# Patient Record
Sex: FEMALE | Race: WHITE | NOT HISPANIC OR LATINO | Employment: FULL TIME | URBAN - METROPOLITAN AREA
[De-identification: names, ages, dates, MRNs, and addresses within clinical notes are randomized per-mention and may not be internally consistent; named-entity substitution may affect disease eponyms.]

---

## 2023-04-19 LAB
ALANINE AMINOTRANSFERASE (SGPT) (U/L) IN SER/PLAS: 14 U/L (ref 7–45)
ALBUMIN (G/DL) IN SER/PLAS: 4.3 G/DL (ref 3.4–5)
ALKALINE PHOSPHATASE (U/L) IN SER/PLAS: 73 U/L (ref 33–110)
ANION GAP IN SER/PLAS: 10 MMOL/L (ref 10–20)
ASPARTATE AMINOTRANSFERASE (SGOT) (U/L) IN SER/PLAS: 22 U/L (ref 9–39)
BILIRUBIN TOTAL (MG/DL) IN SER/PLAS: 0.6 MG/DL (ref 0–1.2)
CALCIUM (MG/DL) IN SER/PLAS: 9.3 MG/DL (ref 8.6–10.6)
CARBON DIOXIDE, TOTAL (MMOL/L) IN SER/PLAS: 30 MMOL/L (ref 21–32)
CHLORIDE (MMOL/L) IN SER/PLAS: 102 MMOL/L (ref 98–107)
CREATININE (MG/DL) IN SER/PLAS: 0.89 MG/DL (ref 0.5–1.05)
ERYTHROCYTE DISTRIBUTION WIDTH (RATIO) BY AUTOMATED COUNT: 12.2 % (ref 11.5–14.5)
ERYTHROCYTE MEAN CORPUSCULAR HEMOGLOBIN CONCENTRATION (G/DL) BY AUTOMATED: 32.2 G/DL (ref 32–36)
ERYTHROCYTE MEAN CORPUSCULAR VOLUME (FL) BY AUTOMATED COUNT: 94 FL (ref 80–100)
ERYTHROCYTES (10*6/UL) IN BLOOD BY AUTOMATED COUNT: 4.48 X10E12/L (ref 4–5.2)
GFR FEMALE: 87 ML/MIN/1.73M2
GLUCOSE (MG/DL) IN SER/PLAS: 62 MG/DL (ref 74–99)
HEMATOCRIT (%) IN BLOOD BY AUTOMATED COUNT: 41.9 % (ref 36–46)
HEMOGLOBIN (G/DL) IN BLOOD: 13.5 G/DL (ref 12–16)
LEUKOCYTES (10*3/UL) IN BLOOD BY AUTOMATED COUNT: 6.3 X10E9/L (ref 4.4–11.3)
NRBC (PER 100 WBCS) BY AUTOMATED COUNT: 0 /100 WBC (ref 0–0)
PLATELETS (10*3/UL) IN BLOOD AUTOMATED COUNT: 270 X10E9/L (ref 150–450)
POTASSIUM (MMOL/L) IN SER/PLAS: 4 MMOL/L (ref 3.5–5.3)
PROTEIN TOTAL: 6.9 G/DL (ref 6.4–8.2)
SODIUM (MMOL/L) IN SER/PLAS: 138 MMOL/L (ref 136–145)
THYROTROPIN (MIU/L) IN SER/PLAS BY DETECTION LIMIT <= 0.05 MIU/L: 1.54 MIU/L (ref 0.44–3.98)
UREA NITROGEN (MG/DL) IN SER/PLAS: 13 MG/DL (ref 6–23)

## 2024-07-09 ENCOUNTER — HOSPITAL ENCOUNTER (OUTPATIENT)
Dept: RADIOLOGY | Facility: HOSPITAL | Age: 34
Discharge: HOME | End: 2024-07-09
Payer: COMMERCIAL

## 2024-07-09 ENCOUNTER — OFFICE VISIT (OUTPATIENT)
Dept: ORTHOPEDIC SURGERY | Facility: HOSPITAL | Age: 34
End: 2024-07-09
Payer: COMMERCIAL

## 2024-07-09 DIAGNOSIS — M54.50 LOW BACK PAIN, UNSPECIFIED BACK PAIN LATERALITY, UNSPECIFIED CHRONICITY, UNSPECIFIED WHETHER SCIATICA PRESENT: ICD-10-CM

## 2024-07-09 DIAGNOSIS — M54.50 LOW BACK PAIN, UNSPECIFIED BACK PAIN LATERALITY, UNSPECIFIED CHRONICITY, UNSPECIFIED WHETHER SCIATICA PRESENT: Primary | ICD-10-CM

## 2024-07-09 PROCEDURE — 72100 X-RAY EXAM L-S SPINE 2/3 VWS: CPT

## 2024-07-09 PROCEDURE — 1036F TOBACCO NON-USER: CPT | Performed by: ORTHOPAEDIC SURGERY

## 2024-07-09 PROCEDURE — 99213 OFFICE O/P EST LOW 20 MIN: CPT | Performed by: ORTHOPAEDIC SURGERY

## 2024-07-09 PROCEDURE — 72148 MRI LUMBAR SPINE W/O DYE: CPT | Performed by: RADIOLOGY

## 2024-07-09 PROCEDURE — 72148 MRI LUMBAR SPINE W/O DYE: CPT

## 2024-07-09 PROCEDURE — 72100 X-RAY EXAM L-S SPINE 2/3 VWS: CPT | Performed by: RADIOLOGY

## 2024-07-09 NOTE — PROGRESS NOTES
PRIMARY CARE PHYSICIAN: Alvino Dejeuss MD  REFERRING PROVIDER: No referring provider defined for this encounter.     CONSULT ORTHOPAEDIC: Lumbar Spine Evaluation      SUBJECTIVE  CHIEF COMPLAINT: Low back pain       HPI: Susana Rivera is a 33 y.o. active female presenting for a new patient evaluation of low back pain.  She notes several years ago when lifting an object feeling sharp pain in her low back.  Since that time she has had difficulty with lifting and has had intermittent low back pain and some mild radicular symptoms.  These have recently been exacerbated and limited her ability to exercise.  No bowel or bladder change.  No lower extremity weakness.  She has attempted activity modifications and home exercises that have failed to provide lasting relief.  She desires to remain active with exercise.  She has utilized oral anti-inflammatory medication for several months.    REVIEW OF SYSTEMS  Constitutional: See HPI for pain assessment, No significant weight loss, recent trauma  Cardiovascular: No chest pain, shortness of breath  Respiratory: No difficulty breathing, cough  Gastrointestinal: No nausea, vomiting, diarrhea, constipation  Musculoskeletal: Noted in HPI, positive for pain, restricted motion, stiffness  Integumentary: No rashes, easy bruising, redness   Neurological: no numbness or tingling in extremities, no gait disturbances   Psychiatric: No mood changes, memory changes, social issues  Heme/Lymph: no excessive swelling, easy bruising, excessive bleeding  ENT: No hearing changes  Eyes: No vision changes    History reviewed. No pertinent past medical history.     No Known Allergies     Past Surgical History:   Procedure Laterality Date    RHINOPLASTY  10/28/2014    Rhinoplasty        No family history on file.     Social History     Socioeconomic History    Marital status: Single     Spouse name: Not on file    Number of children: Not on file    Years of education: Not on file    Highest education  "level: Not on file   Occupational History    Not on file   Tobacco Use    Smoking status: Not on file    Smokeless tobacco: Not on file   Substance and Sexual Activity    Alcohol use: Not on file    Drug use: Not on file    Sexual activity: Not on file   Other Topics Concern    Not on file   Social History Narrative    Not on file     Social Determinants of Health     Financial Resource Strain: Not on file   Food Insecurity: Not on file   Transportation Needs: Not on file   Physical Activity: Not on file   Stress: Not on file   Social Connections: Not on file   Intimate Partner Violence: Not on file   Housing Stability: Not on file        CURRENT MEDICATIONS:   No current outpatient medications on file.     No current facility-administered medications for this visit.        OBJECTIVE    PHYSICAL EXAM      1/15/2020    11:32 AM 2/18/2020    11:05 AM 6/2/2020    10:10 AM 2/1/2022    11:41 AM 3/7/2022     9:46 AM 12/19/2022     3:21 PM 4/18/2023     4:08 PM   Vitals   Systolic 133 112 118 124 120 128 132   Diastolic 75 68 72 78 76 73 70   Heart Rate 71 85 59 77  74    Temp 37.2 °C (99 °F) 37.2 °C (99 °F) 36.7 °C (98 °F)       Resp 16     16    Height (in) 1.727 m (5' 8\") 1.727 m (5' 8\") 1.727 m (5' 8\")  1.727 m (5' 8\")  1.753 m (5' 9\")   Weight (lb) 165 162 180  180.56 185.38 190   BMI 25.09 kg/m2 24.63 kg/m2 27.37 kg/m2  27.45 kg/m2 28.19 kg/m2 28.06 kg/m2   BSA (m2) 1.89 m2 1.88 m2 1.98 m2  1.98 m2 2.01 m2 2.05 m2      There is no height or weight on file to calculate BMI.    MUSCULOSKELETAL:  33 y.o. year-old female in no acute distress. Alert and oriented x 3. Well Nourished. Normal affect. Good historian.  Positive ligamentous hyperlaxity.  Gait: Normal Tandem. No abnormalities of balance or coordination.  Lumbar spine: Full range of motion.  There is a little discomfort with left straight leg raise compared to right.  Positive paraspinal spasm and tenderness.  Skin: Intact bilateral upper and lower extremities. " No erythema, ecchymosis, or temperature changes.   Bilateral hips with full range of motion and negative impingement maneuver.  She is tight in her hip flexors and hamstring.  Motor Strength: 5 out of 5 in the bilateral deltoid, biceps, triceps, wrist flexors, wrist extensors.  Neuro: C5-T1 sensation intact grossly bilaterally. Symmetric bicep/tricep reflexes bilaterally.  Vascular: 2+ radial and ulnar pulses bilaterally.     Imaging:   3 views of the lumbar spine were performed today with some slight rotational malalignment on the AP at L4 to the sacrum.  There is slight loss of disc height at L4-5 and L5-S1.  Mild sclerosis of the posterior elements.    ASSESSMENT & PLAN    Impression: 33 y.o. female with persistent low back pain and paraspinal spasm.    Plan:   I explained to the patient the nature of their diagnosis.  I reviewed their imaging studies with them.    She has worsening symptoms that have been present for several years despite activity modifications.  Radiographs reveal some loss of alignment and disc height narrowing.  Therefore an MRI is ordered to evaluate the status of her intervertebral discs.  Physical therapy is also ordered to maximize her flexibility as well as her core and gluteus medius function.  A formal low back program will be initiated.    Follow-Up: Patient will follow-up after the MRI.  We will discuss additional intervention if formal rehabilitation fails.    At the end of the visit, all questions were answered in full. The patient is in agreement with the plan and recommendations. They will call the office with any questions/concerns.    Note dictated with [employer] software. Completed without full typed error editing and sent to avoid delay.

## 2024-07-10 PROBLEM — M54.50 LUMBAR SPINE PAIN: Status: ACTIVE | Noted: 2024-07-10

## 2024-07-10 NOTE — PROGRESS NOTES
Physical Therapy  Physical Therapy Orthopedic Evaluation    Patient Name: Susana Rivera  MRN: [id number]  Today's Date: 7/11/2024  Time Calculation  Start Time: 1250  Stop Time: 1335  Time Calculation (min): 45 min    Insurance:  Visit number: 1 of 20  Authorization info: NAN; hard limit  Insurance Type: [employer] NO vaso, 4 unit limit. High deductible and OOPmax     General:  Reason for visit: Low back pain, acute on chronic and mild radicular symptoms  Referred by: Dr. Orr     Current Problem:  1. Lumbar spine pain        2. Low back pain, unspecified back pain laterality, unspecified chronicity, unspecified whether sciatica present  Referral to Physical Therapy          Precautions: L3-L4 and L4-L5 disc degeneration, + Slump R  STEADI = 0          Medical History Form: Reviewed (scanned into chart)    Subjective:     Chief Complaint: 34 year old female presents with acute on chronic low back pain with mild radicular symptoms. 12 years ago patient was lifting and felt a sharp pain in her low back. Patient has had intermittent LBP a few times a year since then- with flare ups they can last a few days to a few weeks. Pain with sleeping on soft bed, lifting heavy object. Patient reports she can do everything perfectly fine when not in a flare up. Denies radicular symptoms into the LE.     Onset Date: chronic   ARMEN: Insidious and Chronic    Current Condition:   Worse    Pain:  Pain Assessment: 0-10  0-10 (Numeric) Pain Score: 3  Location: R side low back , radiates into hips   Description: sore, pulling, pain   Current pain: 3/10   Worst pain: 6/10 -pt reports she has high pain tolerance  Meds: NSAID PRN   Aggravating Factors: Lifting/carrying  Relieving Factors:  Lying Supine and Heat    Relevant Information (PMH & Previous Tests/Imaging): MRI: L4-5 disc protrusion contributes to narrowing of the lateral   recesses and mild central canal stenosis.   2. Degenerative changes primarily involving L4-5 and L3-4.   3.  Transitional type lumbosacral vertebral body labeled as a   partially sacralized L5 segment. There are rudimentary ribs at what   is labeled T12.     Previous Interventions/Treatments: Chiropractic- made it worse every time. Massage helps     Prior Level of Function (PLOF)  Patient previously independent with all ADLs  Exercise/Physical Activity: yoga, heavy lifting- not in the past year   Work/School: Desk job     Patients Living Environment: Reviewed and no concern    Primary Language: English    There are no spiritual/cultural practices/values/needs that are important to know    Patient's Goal(s) for Therapy: get rid of pain, get stronger     Red Flags: Do you have any of the following? No  Fever/chills, unexplained weight changes, dizziness/fainting, unexplained change in bowel or bladder functions, unexplained malaise or muscle weakness, night pain/sweats, numbness or tingling    Objective:  Objective       ROM    Lumbar AROM (%)    Flexion: full range, ERP    Extension: WNL  (L) Side Bend: WNL  (R) Side Bend: WNL  (L) Rotation: WNL  (R) Rotation: WNL      Hip AROM (Degrees)      (R)  (L)  Flexion: WNL  WNL   Extension: WNL  WNL    Abduction: WNL, mild pain in R SI  WNL      ER:  Mild limitation  WNL     IR:  WNL  WNL             Strength Testing    Core/Abdominals: 4/5, fair TA activation     Hip    (R)  (L)  Flexion: 4+  4+     Extension: 4+  4+    Abduction: 4 P!  5     ER:  4  5    IR:  5 P!  5        Posture: mild rounded       Palpation: + hypertonicity R QL, thoracolumbar paraspinals,       Flexibility: Min limitation with       Joint Mobility: + pain localized R facets with spring testing L 2-L5 (most sensitive at L4/L5) + fortins sign R SI       Gait: femoral retroversion         Functional Screening    Squat: forward trunk lean   SL Quarter Squat: Min valgus bilaterally         Special Tests    Leg Length Discrepancy: none  SI Alignment: R posterior rotation  Hip Scouring: + tension R SI   SI  Compression Test: + pain R SI   SI Distraction Test: neg  90-90 SLR Test: flexibility WNL, + neural tension R   Misael Test: + limitation B quad  SARINA Test: negative  Slump Test: + R with knee extension   Relfexes 2+ L4 and S1     Radicular Symptoms: + neural tension R slump  Negative clonus       Outcome Measures:  Other Measures  Oswestry Disablity Index (STEPHEN): 10%       EDUCATION: Home exercise program, plan of care, activity modifications, pain management, and injury pathology       Goals: Set and discussed today  Active       PT Problem       PT Goal 1       Start:  07/11/24    Expected End:  09/05/24       Patient will verbalize pain (0-10) 0/10 at rest, and 3/10 worst  Negative gowers sign with lumbar ROM to demonstrate improve joint mechanics and stability during ADLs by 4-6 weeks  Patient will present with no neural tension with R slump test indicating centralization of symptoms by 3-4 weeks   B hip flexion, abduction, ER, extension strength 5/5 MMT for improved pelvic stability by 8 weeks   Oswestry Disability Index score <2% to demonstrate overall improved functional abilities by discharge (8 weeks)  Patient will correctly perform home exercise program to progress current functional status by 1 weeks               Plan of care was developed with input and agreement by the patient      Treatment Performed:    Therapeutic Exercise:    15 min  Access Code: [email or web address]  URL: [email or web address]  Date: 07/11/2024  Prepared by: Madhuri Glez    Exercises  - SELF MASSAGE BALL - GLUTE - WALL  - 1 x daily - 7 x weekly - 3 sets - 10 reps  - Hip Flexor Stretch at Edge of Bed  - 1 x daily - 7 x weekly - 3 sets - 30 hold  - Seated Long Arc Quad (90-45 Degree Range)  - 1 x daily - 7 x weekly - 3 sets - 10 reps  - Supine Transversus Abdominis Bracing - Hands on Stomach  - 1 x daily - 7 x weekly - 2 sets - 10 reps - 5 hold  - Bent Knee Fallouts  - 1 x daily - 7 x weekly - 3 sets - 10  reps    Manual Therapy:    5 min  STM to R QL, lumbar paraspinals         Assessment: 34 year old female presents with acute on chronic, intermittent low back pain. Impairments include: + R sided neural tension, hip and core weakness, + instability with transitional movements, flexibility impairments of quad and hip flexors, and functional limitations. These result in limited participation in pain-free ADLs and inability to perform at their prior level of function. Pt would benefit from physical therapy to address the impairments found & listed previously in the objective section in order to return to safe and pain-free ADLs and prior level of function.       Clinical Presentation: Stable and/or uncomplicated characteristics    Plan:     Therapy plan of care will include the following interventions: aquatic therapy, gait training, dry needling, therapeutic exercise, therapeutic activities, education/instruction, home program, electrical stimulation, manual therapy, mechanical traction, neuromuscular re-education, biofeedback, kinesiotape, cryotherapy, vasopneumatic device with cold, edema control, self care/home management, blood flow restriction (BFR)    Frequency: 1-2 x Week  Duration: 8 Weeks  Rehab Potential/Prognosis: Polo Young, PT

## 2024-07-11 ENCOUNTER — EVALUATION (OUTPATIENT)
Dept: PHYSICAL THERAPY | Facility: HOSPITAL | Age: 34
End: 2024-07-11
Payer: COMMERCIAL

## 2024-07-11 DIAGNOSIS — M54.50 LUMBAR SPINE PAIN: Primary | ICD-10-CM

## 2024-07-11 DIAGNOSIS — M54.50 LOW BACK PAIN, UNSPECIFIED BACK PAIN LATERALITY, UNSPECIFIED CHRONICITY, UNSPECIFIED WHETHER SCIATICA PRESENT: ICD-10-CM

## 2024-07-11 PROCEDURE — 97161 PT EVAL LOW COMPLEX 20 MIN: CPT | Mod: GP

## 2024-07-11 PROCEDURE — 97110 THERAPEUTIC EXERCISES: CPT | Mod: GP

## 2024-07-11 ASSESSMENT — ENCOUNTER SYMPTOMS
LOSS OF SENSATION IN FEET: 0
DEPRESSION: 0
OCCASIONAL FEELINGS OF UNSTEADINESS: 0

## 2024-07-11 ASSESSMENT — PAIN - FUNCTIONAL ASSESSMENT: PAIN_FUNCTIONAL_ASSESSMENT: 0-10

## 2024-07-11 ASSESSMENT — PAIN SCALES - GENERAL: PAINLEVEL_OUTOF10: 3

## 2024-07-15 ENCOUNTER — TREATMENT (OUTPATIENT)
Dept: PHYSICAL THERAPY | Facility: HOSPITAL | Age: 34
End: 2024-07-15
Payer: COMMERCIAL

## 2024-07-15 DIAGNOSIS — M54.50 LUMBAR SPINE PAIN: ICD-10-CM

## 2024-07-15 DIAGNOSIS — M54.50 LOW BACK PAIN, UNSPECIFIED BACK PAIN LATERALITY, UNSPECIFIED CHRONICITY, UNSPECIFIED WHETHER SCIATICA PRESENT: ICD-10-CM

## 2024-07-15 PROCEDURE — 97140 MANUAL THERAPY 1/> REGIONS: CPT | Mod: GP | Performed by: PHYSICAL THERAPIST

## 2024-07-15 PROCEDURE — 97110 THERAPEUTIC EXERCISES: CPT | Mod: GP | Performed by: PHYSICAL THERAPIST

## 2024-07-18 ENCOUNTER — TREATMENT (OUTPATIENT)
Dept: PHYSICAL THERAPY | Facility: HOSPITAL | Age: 34
End: 2024-07-18
Payer: COMMERCIAL

## 2024-07-18 DIAGNOSIS — M54.50 LOW BACK PAIN, UNSPECIFIED BACK PAIN LATERALITY, UNSPECIFIED CHRONICITY, UNSPECIFIED WHETHER SCIATICA PRESENT: ICD-10-CM

## 2024-07-18 DIAGNOSIS — M54.50 LUMBAR SPINE PAIN: ICD-10-CM

## 2024-07-18 PROCEDURE — 97110 THERAPEUTIC EXERCISES: CPT | Mod: GP | Performed by: PHYSICAL THERAPIST

## 2024-07-18 PROCEDURE — 97140 MANUAL THERAPY 1/> REGIONS: CPT | Mod: GP | Performed by: PHYSICAL THERAPIST

## 2024-07-18 ASSESSMENT — PAIN - FUNCTIONAL ASSESSMENT
PAIN_FUNCTIONAL_ASSESSMENT: 0-10
PAIN_FUNCTIONAL_ASSESSMENT: 0-10

## 2024-07-18 ASSESSMENT — PAIN SCALES - GENERAL
PAINLEVEL_OUTOF10: 3
PAINLEVEL_OUTOF10: 2

## 2024-07-18 NOTE — PROGRESS NOTES
Physical Therapy  Physical Therapy Treatment Note    Patient Name: Susana Rivera  MRN: [id number]  Today's Date: 7/15/2024  Time Calculation  Start Time: 1330  Stop Time: 1430  Time Calculation (min): 60 min    Insurance:  Visit number: 2 of 20  Authorization info: NAN; hard limit  Insurance Type: [employer] NO vaso, 4 unit limit. High deductible and OOPmax      General:  Reason for visit: Low back pain, acute on chronic and mild radicular symptoms  Referred by: Dr. Orr       Current Problem  1. Lumbar spine pain  Follow Up In Physical Therapy      2. Low back pain, unspecified back pain laterality, unspecified chronicity, unspecified whether sciatica present  Follow Up In Physical Therapy          Precautions: L3-L4 and L4-L5 disc degeneration, + Slump R  STEADI = 0       Subjective:     Patient reports general improvement in symptoms following initial evaluation; has continued to implement HEP without issues. Patient continues to experience acute low back pain with symptoms worse on (R) compared to (L)    Pain  Pain Assessment: 0-10  0-10 (Numeric) Pain Score: 3    Performing HEP?: Yes      Objective:   Core/Abdominals: 4/5, fair TA activation     Continued (+) TTP to (R) QL, thoracolumbar paraspinals, glute med, and piriformis    (R) Anterior Innominate      Treatment Performed:  Therapeutic Exercise  Therapeutic Exercise Activity 1: Pelvic Tilt 2 x 10 reps  Therapeutic Exercise Activity 2: Braeden Curl Up 2 x 5 reps w/ 3 sec hold (each side)  Therapeutic Exercise Activity 3: Bridge 2 x 10 reps  Therapeutic Exercise Activity 4: Bird Dog 2 x 10 reps  Therapeutic Exercise Activity 5: Side lying clams 2 x 10 reps w/ RTB  Therapeutic Exercise Activity 6: Pallof Press 2 x 10 reps (each side)  Therapeutic Exercise Activity 7: Squats 3 x 10 reps w/ 10lbs              Manual Therapy  Manual Therapy Activity 1: STM to (B) QL, lumbar paraspinals, glute med, and piriformis  Manual Therapy Activity 2: MET to correct  innominate              PT Therapeutic Procedures Time Entry  Manual Therapy Time Entry: 30  Therapeutic Exercise Time Entry: 30                    Assessment:   The focus of the session was Strengthening, joint mobilizations, and soft tissue massage. The pt demonstrated Good tolerance to the noted exercises today. The pt is demonstrated Good progress in skilled rehab at this time. The pt is still limited in overall Strength, Motor control, and Pain at this time. The pt continues to be a good candidate for skilled PT, in order to further improve Strength, Motor control, and Pain.        Plan:  Continue to progress Core, Hip, and LE strength focusing on restoring stability and proper movement mechanics; Manual therapy to restore pain free mobility; Patient education on HEP and activity modification.        Ankush Alegria, PT

## 2024-07-18 NOTE — PROGRESS NOTES
Physical Therapy  Physical Therapy Treatment Note    Patient Name: Susana Rivera  MRN: [id number]  Today's Date: 7/18/2024  Time Calculation  Start Time: 1330  Stop Time: 1430  Time Calculation (min): 60 min    Insurance:  Visit number: 3 of 20  Authorization info: NAN; hard limit  Insurance Type: [employer] NO vaso, 4 unit limit. High deductible and OOPmax      General:  Reason for visit: Low back pain, acute on chronic and mild radicular symptoms  Referred by: Dr. Orr       Current Problem  1. Lumbar spine pain  Follow Up In Physical Therapy      2. Low back pain, unspecified back pain laterality, unspecified chronicity, unspecified whether sciatica present  Follow Up In Physical Therapy          Precautions: L3-L4 and L4-L5 disc degeneration, + Slump R  STEADI = 0       Subjective:     Patient notes this is the best her back has felt in over a month. Reports general improvement in symptoms following last visit    Pain  Pain Assessment: 0-10  0-10 (Numeric) Pain Score: 2    Performing HEP?: Yes      Objective:   Core/Abdominals: 4/5, fair TA activation     Continued (+) TTP to (R) QL, thoracolumbar paraspinals, glute med, and piriformis    (R) Anterior Innominate      Treatment Performed:  Therapeutic Exercise  Therapeutic Exercise Activity 1: Pelvic Tilt 2 x 10 reps  Therapeutic Exercise Activity 2: Braeden Curl Up 2 x 5 reps w/ 3 sec hold (each side)  Therapeutic Exercise Activity 3: Bridge 2 x 10 reps  Therapeutic Exercise Activity 4: Bird Dog 2 x 10 reps  Therapeutic Exercise Activity 6: Pallof Press 2 x 10 reps (each side)  Therapeutic Exercise Activity 7: Squats 3 x 10 reps w/ 25lbs  Therapeutic Exercise Activity 8: Squats on jump  2 x 10 reps  Therapeutic Exercise Activity 9: Supine core brace march 2 x 10 reps  Therapeutic Exercise Activity 10: Nerve glides              Manual Therapy  Manual Therapy Activity 1: STM to (B) QL, lumbar paraspinals, glute med, and piriformis  Manual Therapy Activity  2: MET to correct innominate              PT Therapeutic Procedures Time Entry  Manual Therapy Time Entry: 25  Therapeutic Exercise Time Entry: 35                    Assessment:   The focus of the session was Strengthening, joint mobilizations, and soft tissue massage. The pt demonstrated Good tolerance to the noted exercises today. The pt is demonstrated Good progress in skilled rehab at this time. The pt is still limited in overall Strength, Motor control, and Pain at this time. The pt continues to be a good candidate for skilled PT, in order to further improve Strength, Motor control, and Pain.  Patient instructed to continue HEP while on vacation next week.      Plan:  Continue to progress Core, Hip, and LE strength focusing on restoring stability and proper movement mechanics; Manual therapy to restore pain free mobility; Patient education on HEP and activity modification.        Ankush Alegria, PT

## 2024-07-29 ENCOUNTER — APPOINTMENT (OUTPATIENT)
Dept: PHYSICAL THERAPY | Facility: HOSPITAL | Age: 34
End: 2024-07-29
Payer: COMMERCIAL

## 2024-08-06 ENCOUNTER — APPOINTMENT (OUTPATIENT)
Dept: PHYSICAL THERAPY | Facility: HOSPITAL | Age: 34
End: 2024-08-06
Payer: COMMERCIAL

## 2024-08-08 ENCOUNTER — APPOINTMENT (OUTPATIENT)
Dept: PHYSICAL THERAPY | Facility: HOSPITAL | Age: 34
End: 2024-08-08
Payer: COMMERCIAL

## 2024-11-26 ENCOUNTER — TELEPHONE (OUTPATIENT)
Facility: CLINIC | Age: 34
End: 2024-11-26
Payer: COMMERCIAL

## 2024-11-26 DIAGNOSIS — M51.360 DEGENERATION OF INTERVERTEBRAL DISC OF LUMBAR REGION WITH DISCOGENIC BACK PAIN: Primary | ICD-10-CM

## 2024-11-26 RX ORDER — PREDNISONE 10 MG/1
TABLET ORAL
Qty: 30 TABLET | Refills: 0 | Status: SHIPPED | OUTPATIENT
Start: 2024-11-26 | End: 2024-12-07

## 2024-11-26 NOTE — TELEPHONE ENCOUNTER
Lower back pain-MRI done with DDD and bulging disc. Recommend Prednisone 40/30/20/10 mg taper. ? PT/DC-Follow up if symptoms persist/worse.

## 2024-12-24 ENCOUNTER — TELEPHONE (OUTPATIENT)
Facility: CLINIC | Age: 34
End: 2024-12-24
Payer: COMMERCIAL

## 2024-12-24 DIAGNOSIS — R05.8 UPPER AIRWAY COUGH SYNDROME: Primary | ICD-10-CM

## 2024-12-24 RX ORDER — ALBUTEROL SULFATE 90 UG/1
2 INHALANT RESPIRATORY (INHALATION) EVERY 6 HOURS PRN
Qty: 18 G | Refills: 0 | Status: SHIPPED | OUTPATIENT
Start: 2024-12-24 | End: 2024-12-24 | Stop reason: SDUPTHER

## 2024-12-24 RX ORDER — ALBUTEROL SULFATE 90 UG/1
2 INHALANT RESPIRATORY (INHALATION) EVERY 6 HOURS PRN
Qty: 18 G | Refills: 0 | Status: SHIPPED | OUTPATIENT
Start: 2024-12-24 | End: 2025-01-23

## 2025-01-28 ENCOUNTER — PATIENT MESSAGE (OUTPATIENT)
Dept: ENDOCRINOLOGY | Facility: CLINIC | Age: 35
End: 2025-01-28
Payer: COMMERCIAL

## 2025-01-28 ASSESSMENT — LIFESTYLE VARIABLES
TOBACCO_USE: NO
HISTORY_ALCOHOL_USE: YES
HISTORY_ALCOHOL_USE: YES
TOBACCO_USE: NO

## 2025-01-29 ENCOUNTER — CONSULT (OUTPATIENT)
Dept: ENDOCRINOLOGY | Facility: CLINIC | Age: 35
End: 2025-01-29
Payer: COMMERCIAL

## 2025-01-29 VITALS
HEIGHT: 69 IN | OXYGEN SATURATION: 100 % | DIASTOLIC BLOOD PRESSURE: 81 MMHG | WEIGHT: 195.36 LBS | BODY MASS INDEX: 28.93 KG/M2 | TEMPERATURE: 98.3 F | HEART RATE: 84 BPM | RESPIRATION RATE: 14 BRPM | SYSTOLIC BLOOD PRESSURE: 144 MMHG

## 2025-01-29 DIAGNOSIS — Z13.1 SCREENING FOR DIABETES MELLITUS: ICD-10-CM

## 2025-01-29 DIAGNOSIS — N91.4 SECONDARY OLIGOMENORRHEA: ICD-10-CM

## 2025-01-29 DIAGNOSIS — Z11.3 SCREENING FOR STDS (SEXUALLY TRANSMITTED DISEASES): ICD-10-CM

## 2025-01-29 DIAGNOSIS — Z31.41 FERTILITY TESTING: Primary | ICD-10-CM

## 2025-01-29 DIAGNOSIS — Z01.83 ENCOUNTER FOR RH BLOOD TYPING: ICD-10-CM

## 2025-01-29 PROCEDURE — 99215 OFFICE O/P EST HI 40 MIN: CPT | Performed by: OBSTETRICS & GYNECOLOGY

## 2025-01-29 PROCEDURE — 99205 OFFICE O/P NEW HI 60 MIN: CPT | Performed by: OBSTETRICS & GYNECOLOGY

## 2025-01-29 ASSESSMENT — PATIENT HEALTH QUESTIONNAIRE - PHQ9
2. FEELING DOWN, DEPRESSED OR HOPELESS: NOT AT ALL
1. LITTLE INTEREST OR PLEASURE IN DOING THINGS: NOT AT ALL
SUM OF ALL RESPONSES TO PHQ9 QUESTIONS 1 AND 2: 0

## 2025-01-29 ASSESSMENT — COLUMBIA-SUICIDE SEVERITY RATING SCALE - C-SSRS
6. HAVE YOU EVER DONE ANYTHING, STARTED TO DO ANYTHING, OR PREPARED TO DO ANYTHING TO END YOUR LIFE?: NO
2. HAVE YOU ACTUALLY HAD ANY THOUGHTS OF KILLING YOURSELF?: NO
1. IN THE PAST MONTH, HAVE YOU WISHED YOU WERE DEAD OR WISHED YOU COULD GO TO SLEEP AND NOT WAKE UP?: NO

## 2025-01-29 ASSESSMENT — PAIN SCALES - GENERAL: PAINLEVEL_OUTOF10: 0-NO PAIN

## 2025-01-29 NOTE — PROGRESS NOTES
"  Visit Type: In Person  N/A    NEW FERTILITY PATIENT VISIT    Referred by: Marie Atkins  Here with mother Irene Rivera ([facility])    Accompanied today by: My mom might       Susana Rivera is a 34 y.o.  female who presents with    desire for egg freezing.     Have you had any concerns about your fertility treatments so far? No     What are you goals for today's visit? To discuss potentially freezing my eggs     What causes of infertility have been identified on your workup so far? N/A     Past Infertility Treatments: No      Please summarize your fertility treatments to date.       As far as you are aware, do you have insurance coverage for fertility diagnostic testing and/or fertility treatments? No    She wonders whether she might have PCOS. She had acne and has noted some facial hair. Periods have generally been regular, but occ oligomenorrhea.     She has gained weight since  - approx 30 pounds. BMI = 28  Her mother's father became a type 2 diabetic at 80.   BMI 28.85    Although Ms. Rivera's Vilma model risk score is normal, and she is negative for BrCA1 and BRCA2, she underwent the 35-gene Myriad [facility] panel and has a \"variant of uncertain significance.\" Her Tyrer Cuzick score per Myriad is 31.6%. The proprietary Myriad risk Score is 39.8% lifetime risk. Therefore she has received recommendation from genetic counseling of annual MMG and MRI beginning at age 35.     Prior Labs  Lab Results    Date Done      AMH: No results found for requested labs within last 1825 days. No results found for requested labs within last 1825 days.   TSH: 1.54 (Ref range: 0.44 - 3.98 mIU/L) 2023   PRL: 6.5 (Ref range: 3.0 - 20.0 ug/L) 2020   Testosterone: No results found for requested labs within last 1825 days. No results found for requested labs within last 1825 days.   DHEAS: 355 (Ref range: 12 - 379 ug/dL) 2020   FSH: No results found for requested labs within last 1825 days. No results found for " requested labs within last 1825 days.   17 OHP: No results found for requested labs within last 1825 days. No results found for requested labs within last 1825 days.   HgbA1c: No results found for requested labs within last 1825 days. No results found for requested labs within last 1825 days.   Hepatitis B surface antigen: NONREACTIVE (Ref range: NONREACTIVE) 12/19/2022   Hepatitis C antibody: NONREACTIVE (Ref range: NONREACTIVE) 12/19/2022   HIV ½ Antigen Antibody screen with reflex: NONREACTIVE (Ref range: NONREACTIVE) 12/19/2022   Syphilis screening with reflex: No results found for requested labs within last 1825 days. No results found for requested labs within last 1825 days.   GC: No results found for requested labs within last 1825 days. No results found for requested labs within last 1825 days.   CT: No results found for requested labs within last 1825 days. No results found for requested labs within last 1825 days.   Type and Screen: No results found for requested labs within last 1825 days. No results found for requested labs within last 1825 days.   Rh: No results found for requested labs within last 1825 days. No results found for requested labs within last 1825 days.   Antibody: No results found for requested labs within last 1825 days. No results found for requested labs within last 1825 days.   Rubella: No results found for requested labs within last 1825 days. No results found for requested labs within last 1825 days.   Varicella: No results found for requested labs within last 1825 days. No results found for requested labs within last 1825 days.   Hemoglobin: No results found for requested labs within last 1825 days. No results found for requested labs within last 1825 days.   Hematocrit: No results found for requested labs within last 1825 days. No results found for requested labs within last 1825 days.   Creatinine: 0.89 (Ref range: 0.50 - 1.05 mg/dL) 4/18/2023   AST:22 (Ref range: 9 - 39 U/L)  "2023   ALT:14 (Ref range: 7 - 45 U/L): 2023   Relationship Status:   Single    Have you ever been pregnant? No    How many times have you been pregnant?    Have you ever had a miscarriage? No    How many times have you had a miscarriage?       OB Hx     OB History          0    Para   0    Term   0       0    AB   0    Living   0         SAB   0    IAB   0    Ectopic   0    Multiple   0    Live Births   0                 GYN HISTORY   Have you ever been diagnosed with a sexually transmitted disease? No    Please select all that are applicable:    Have you ever had Pelvic Inflammatory Disease? No    Have you had an abnormal PAP smear? No    Date & Result of last PAP smear: No results found for: \"FINALINTERP\"   Have you ever had an abnormal Mammogram? No    Date & result of your last mammogram:    Do you have pelvic pain? No    How many times per week do you have intercourse?    Do you have pain with intercourse? No    Do you use lubricants with intercourse?    Do you have pain with bowel movements? No              Do you have pain with a full bladder? No    MENSTRUAL HISTORY  LMP: Other    Menarche: When I was either 9 or 10 years old    Contraception: No hormonal birth control - my forms are abstinence and condoms    Cycle length: 28    Describe your bleeding: Average    Dysmenorrhea: Yes       ENDOCRINE/INFERTILITY HISTORY  Duration of infertility: Not applicable    Coital Activity/week:    Nipple Discharge: No    Vision changes: No    Headaches: No    Excess hair growth: Yes    Excessive hair loss: No    Acne: No    Oily skin: Yes    Recent weight change  Weight gain: Yes    Weight loss: No    Exercise more than 3 times a week: No      PMH  History reviewed. No pertinent past medical history.     MEDICATIONS  Current Outpatient Medications on File Prior to Visit   Medication Sig Dispense Refill    albuterol 90 mcg/actuation inhaler Inhale 2 puffs every 6 hours if needed for wheezing. 18 g " "0     No current facility-administered medications on file prior to visit.        [facility]  Past Surgical History:   Procedure Laterality Date    RHINOPLASTY  10/28/2014    Rhinoplasty        PSYCH HISTORY  Have you ever been diagnosed with a mental health Issue? No    Have you ever been hospitalized for a mental health disorder? No       SOCIAL HISTORY  Social History     Tobacco Use    Smoking status: Never     Passive exposure: Never    Smokeless tobacco: Never   Substance Use Topics    Alcohol use: Yes    Drug use: Never     Occupation:     Have you ever been incarcerated? No    Do you have a history of domestic violence? No    Do you feel safe at home? Yes    Do you have a history of any negative sexual experience such as incest or rape? No    FAMILY HISTORY  No family history on file.    CANCER HISTORY    Breast: Yes - Grandmother (mom's mom), and Aunt (dad's sister. Hers was premenopausal)    Ovarian: No    Colon: No    Endometrial: No      FAMILY VTE HISTORY  Family History of Blood Clots: Yes - I think both of my grandfathers?      GENETIC HISTORY  Ethnic Background  Patient: Zoroastrianism    Partner:    Genetic Disease in Family  Patient: No    Partner:    Birth Defects in Family  Patient: No    Partner:    Genetic screening performed previously:       BMI:   BMI Readings from Last 1 Encounters:   25 28.85 kg/m²     VITALS:  /81   Pulse 84   Temp 36.8 °C (98.3 °F) (Oral)   Resp 14   Ht 1.753 m (5' 9\")   Wt 88.6 kg (195 lb 5.8 oz)   LMP 2025   SpO2 100%   BMI 28.85 kg/m²     ASSESSMENT   34 y.o.  female with hx possible PCOS, here for egg freezing discussion     PLAN  Orders Placed This Encounter   Procedures    US pelvis transvaginal    Antimullerian Hormone (Amh)    17-Hydroxyprogesterone    Hemoglobin A1C    Testosterone,Free and Total    Dhea-Sulfate    Type And Screen Is this order related to pregnancy or an upcoming surgery? No    Hepatitis B surface antigen    " Hepatitis C Antibody    HIV 1/2 Antigen/Antibody Screen with Reflex to Confirmation    Syphilis Screen with Reflex    C. trachomatis / N. gonorrhoeae, Amplified, Urogenital       We reviewed IVF and discussed the following:   In-vitro fertilization and embryo transfer  Stimulation protocols   Oocyte retrieval, risks    Cryopreservation   Assessment of fertilization   Embryo development  Statistics  ICSI/Assisted hatching   Embryo transfer and preparation    Risks of OHSS and multiple gestation   Cancelled cycles   Use of birth control   Selective reduction   Number of embryos to transfer   Ectopic pregnancy  and miscarriage  Team based care  Informed consent procedures  Folic acid supplementation   Genetic carrier screening   PGT  Frozen tissue storage and transport process  Discussed that pap and mammogram must be updated per ACOG guidelines before treatment can begin    ART Cycle Plan    1. Protocol/Fertility Plan Update:  Lead in: OCP  Stimulation protocol: TBD based on AMH   Letrozole with stim due to fam hx breast cancer  Trigger plan: TBD  Pre-retrieval meds: Antibiotics per protocol  Adjuncts: TBD  Notes:     2. FET: NA    3. Insemination: NA    4. Transfer: NA    5. Cryopreservation plan  Oocyte cryopreservation: Yes, Freeze mature eggs only    6. Patient willing to accept blood transfusion: Yes    7. RN to review chart, initiate IVF boarding pass, and assure completion of the following prior to proceeding with IVF stimulation:         STDs (Hepatitis B, Hepatitis C, HIV, Syphilis, GC/CT) for patient and partner (if applicable) to be completed within the last year (z11.3)  Genetic carrier testing: waiver or carrier screen completed with clearance documentation by provider for both patient and partner (z13.71)  Rubella and varicella to be completed within the last five years (z11.59)   TSH to be completed within the last year (z13.29)  Type & Screen to be completed within the last year (z01.83)  AMH to be  completed within the last year (z31.41)  Pre-IVF Imaging: Reference any orders placed by provider.  Cavity evaluation: NA  Frozen sperm sample: ensure frozen partner sample (z31.41) or verify donor sperm on site prior to stimulation start date.  Verify in EMR or obtain copy of patient’s last mammogram (if applicable) and pap smear results for provider review in boarding pass.  Enroll in Engaged MD and complete annual consent forms for IVF and cryotransport agreements.  BMI checklist for BMI <18 or >40  Consults: Nursing and Financial Consult.  PAT Consult: No  Ectopic Risk: No  Medically Complex: No  Additional consults [facility]- I WILL REACH OUT TO JUSTYNA DOMINGUEZ and review what is in the boarding pass.    Intimate Exam Performed: No, an intimate exam was not performed at this encounter.     Sharyn Stoner  01/29/2025  4:21 PM

## 2025-01-30 ENCOUNTER — LAB REQUISITION (OUTPATIENT)
Dept: LAB | Facility: HOSPITAL | Age: 35
End: 2025-01-30
Payer: COMMERCIAL

## 2025-01-30 ENCOUNTER — ANCILLARY PROCEDURE (OUTPATIENT)
Dept: ENDOCRINOLOGY | Facility: CLINIC | Age: 35
End: 2025-01-30
Payer: COMMERCIAL

## 2025-01-30 DIAGNOSIS — Z01.83 ENCOUNTER FOR BLOOD TYPING: ICD-10-CM

## 2025-01-30 DIAGNOSIS — N91.4 SECONDARY OLIGOMENORRHEA: ICD-10-CM

## 2025-01-30 LAB
ABO GROUP (TYPE) IN BLOOD: NORMAL
ANTIBODY SCREEN: NORMAL
RH FACTOR (ANTIGEN D): NORMAL

## 2025-01-30 PROCEDURE — 76830 TRANSVAGINAL US NON-OB: CPT | Performed by: STUDENT IN AN ORGANIZED HEALTH CARE EDUCATION/TRAINING PROGRAM

## 2025-01-30 PROCEDURE — 86850 RBC ANTIBODY SCREEN: CPT

## 2025-01-30 PROCEDURE — 86901 BLOOD TYPING SEROLOGIC RH(D): CPT

## 2025-01-30 PROCEDURE — 76830 TRANSVAGINAL US NON-OB: CPT

## 2025-01-30 PROCEDURE — 86900 BLOOD TYPING SEROLOGIC ABO: CPT

## 2025-01-30 PROCEDURE — 86900 BLOOD TYPING SEROLOGIC ABO: CPT | Mod: OUT | Performed by: OBSTETRICS & GYNECOLOGY

## 2025-02-02 LAB
17OHP SERPL-MCNC: NORMAL NG/DL
C TRACH RRNA SPEC QL NAA+PROBE: NOT DETECTED
DHEA-S SERPL-MCNC: 243 MCG/DL (ref 19–237)
EST. AVERAGE GLUCOSE BLD GHB EST-MCNC: 111 MG/DL
EST. AVERAGE GLUCOSE BLD GHB EST-SCNC: 6.2 MMOL/L
HBA1C MFR BLD: 5.5 % OF TOTAL HGB
HBV SURFACE AG SERPL QL IA: NORMAL
HCV AB SERPL QL IA: NORMAL
HIV 1+2 AB+HIV1 P24 AG SERPL QL IA: NORMAL
MIS SERPL-MCNC: NORMAL NG/ML
N GONORRHOEA RRNA SPEC QL NAA+PROBE: NOT DETECTED
QUEST GC CT AMPLIFIED (ALWAYS MESSAGE): NORMAL
T PALLIDUM AB SER QL IA: NEGATIVE
TESTOST FREE SERPL-MCNC: NORMAL PG/ML
TESTOST SERPL-MCNC: NORMAL NG/DL

## 2025-02-05 DIAGNOSIS — Z31.41 FERTILITY TESTING: ICD-10-CM

## 2025-02-05 DIAGNOSIS — Z31.83 ENCOUNTER FOR ASSISTED REPRODUCTIVE FERTILITY CYCLE: ICD-10-CM

## 2025-02-05 DIAGNOSIS — Z11.59 ENCOUNTER FOR SCREENING FOR OTHER VIRAL DISEASES: ICD-10-CM

## 2025-02-05 DIAGNOSIS — N97.9 FEMALE INFERTILITY: ICD-10-CM

## 2025-02-05 NOTE — PROGRESS NOTES
Boarding Pass IVF-Egg Freezing    Age: 34 y.o.    Provider: Sharyn Stoner MD  Primary RN: Polina  Reasons for Treatment:  Egg freezing. Possible PCOS. NARESH.  Last BMI  25 : 28.85 kg/m²       No past medical history on file.    Date Done Consultation Results/Comments   25 Medication Protocol Lead in: Estrace  Fertility Plan Update: luteal estrace lead in antagonist with  and menopur 150   Trigger plan:  TBD  Pre-retrieval meds: Antibiotics per protocol  Adjuncts: Adjuncts: None (no letrozole as previously discussed due to NARESH)   Notes:     Authorization to Share [x]  Not needed, egg freezing     GYN Waiver [x]   N/a   2025 IVF Consult  [x]    PGT-A/M? No   *** IVF Information and Authorization (to be completed annually) Received and in chart: {RHETT Yes (Name):[id number]}   25  Waiver (Out) Form Received and in chart: Yes (Polina Hoff RN)   *** [employer] Packet []    25 Procedure Order Placed [x]  Pended on 25      MFM Consult Okay to proceed? N/A    Psych Consult Okay to proceed? N/A    Genetics Consult Okay to proceed? N/A    Other    Date Done Female Labs Results/Comments   2025 T&S (Q 1 Year) ABO: O  Rh: POS  Antibody: NEG     2025 Hep B sAg NON-REACTIVE   2025 Hep C AB NON-REACTIVE   2025 HIV NON-REACTIVE   2025 Syphilis Negative   2025 GC/CT GC: NOT DETECTED  CT: NOT DETECTED    Rubella (Q 5 Years) N/a    Varicella (Q 5 Years) N/a    TSH N/a   2025 HgbA1C 5.5 (Ref range: <5.7 % of total Hgb)   2025 AMH 0.28   2019 Carrier Screening [facility]      Uterine Cavity Eval Pelvic US: 25  Unremarkable pelvic survey. Anteverted uterus with trilaminar appearance of the endometrium. Normal appearing ovaries bilaterally. Transabdominal images were acquired in  addition to transvaginal images for optimal visualization of pelvic structures.      HSG: ***    SIS: ***    Hyster: (***)     2023 Pap Smear Lab Results   Component  "Value Date    CVTFINALDX  04/18/2023     A.  THINPREP PAP CERVICAL:              Specimen adequacy:       SATISFACTORY FOR EVALUATION.       Quality Indicator: Endocervical/transformation zone component is present.              General Categorization:       NEGATIVE FOR INTRAEPITHELIAL LESION OR MALIGNANCY.              Descriptive Interpretation:       SHIFT IN VAGINAL JOVAN SUGGESTIVE OF BACTERIAL VAGINOSIS.                            HIGH RISK HPV TEST RESULT:                       HPV GENOTYPE  16                      NEGATIVE       HPV GENOTYPE  18                      NEGATIVE       HPV GENOTYPE  OTHER             NEGATIVE              Reference Range: Negative                       Mammogram ( > 40) N/a              Although Ms. Rivera's Vilma model risk score is normal, and she is negative for BrCA1 and BRCA2, she underwent the 35-gene Myriad Genetics panel and has a \"variant of uncertain significance.\" Her Tyrer Cuzick score per Myriad is 31.6%. The proprietary Myriad risk Score is 39.8% lifetime risk. Therefore she has received recommendation from genetic counseling of annual MMG and MRI beginning at age 35   Additional consults [facility]- I WILL REACH OUT TO JUSTYNA DOMINGUEZ and review what is in the boarding pass    Date Done Male Labs   Results/Comments  N/A- Egg Freezing    Hep B sAg     Hep C AB      HIV     Syphilis     GC/CT GC:   CT:     Carrier Screening         Semen Analysis  Volume(mL):   Concentration(million/mL):   Motility(%):   Motile Count(million):     Sperm Freeze  # of vials:   TMS post thaw:    Date Done Miscellaneous Results/Comments    BMI Checklist  BMI > 40 or < 18 Added to chart:   No    >= 45 Checklist  Added to chart:   No   **Does not need to be completed prior to placing on IVF calendar**    MD Completion: Completed in consult note  PAT needed: No  Ectopic Risk: No  Medically Complex: No  "

## 2025-02-06 ENCOUNTER — PATIENT MESSAGE (OUTPATIENT)
Dept: ENDOCRINOLOGY | Facility: CLINIC | Age: 35
End: 2025-02-06
Payer: COMMERCIAL

## 2025-02-06 LAB
17OHP SERPL-MCNC: 53 NG/DL
C TRACH RRNA SPEC QL NAA+PROBE: NOT DETECTED
DHEA-S SERPL-MCNC: 243 MCG/DL (ref 19–237)
EST. AVERAGE GLUCOSE BLD GHB EST-MCNC: 111 MG/DL
EST. AVERAGE GLUCOSE BLD GHB EST-SCNC: 6.2 MMOL/L
HBA1C MFR BLD: 5.5 % OF TOTAL HGB
HBV SURFACE AG SERPL QL IA: NORMAL
HCV AB SERPL QL IA: NORMAL
HIV 1+2 AB+HIV1 P24 AG SERPL QL IA: NORMAL
MIS SERPL-MCNC: 0.28 NG/ML (ref 0.18–5.68)
N GONORRHOEA RRNA SPEC QL NAA+PROBE: NOT DETECTED
QUEST GC CT AMPLIFIED (ALWAYS MESSAGE): NORMAL
T PALLIDUM AB SER QL IA: NEGATIVE
TESTOST FREE SERPL-MCNC: 3.4 PG/ML (ref 0.1–6.4)
TESTOST SERPL-MCNC: 34 NG/DL (ref 2–45)

## 2025-02-06 NOTE — PROGRESS NOTES
Susana Rivera female   1990 34 y.o.   [id number]           HPI  Susana Rivera is a 34 y.o.  female with Ashkenazi ancestry referred by Sharyn Brenner [facility] for high risk breast surveillance are. She denies breast surgery or biopsy. 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 She has family history of breast  cancer in paternal aunt age 47, maternal grandmother age 58 and 75.     She is under RHETT care for egg freezing.     BREAST IMAGING: None    REPRODUCTIVE HISTORY: menarche age 9, nullipara, no hormonal birth control, premenopausal,     FAMILY CANCER HISTORY:   Paternal aunt: breast cancer age 47  Maternal grandmother: breast cancer age 58      REVIEW OF SYSTEMS    Constitutional:  Negative for appetite change, fatigue, fever and unexpected weight change.   HENT:  Negative for ear pain, hearing loss, nosebleeds, sore throat and trouble swallowing.    Eyes:  Negative for discharge, itching and visual disturbance.   Respiratory:  Negative for cough, chest tightness and shortness of breath.    Cardiovascular:  Negative for chest pain, palpitations and leg swelling.   Breast: as indicated in HPI  Gastrointestinal:  Negative for abdominal pain, constipation, diarrhea and nausea.   Endocrine: Negative for cold intolerance and heat intolerance.   Genitourinary:  Negative for dysuria, frequency, hematuria, pelvic pain and vaginal bleeding.   Musculoskeletal:  Negative for arthralgias, back pain, gait problem, joint swelling and myalgias.   Skin:  Negative for color change and rash.   Allergic/Immunologic: Negative for environmental allergies and food allergies.   Neurological:  Negative for dizziness, tremors, speech difficulty, weakness, numbness and headaches.   Hematological:  Does not bruise/bleed easily.   Psychiatric/Behavioral:  Negative for agitation, dysphoric mood and  sleep disturbance. The patient is not nervous/anxious.         MEDICATIONS  Current Outpatient Medications   Medication Instructions    albuterol 90 mcg/actuation inhaler 2 puffs, inhalation, Every 6 hours PRN        ALLERGIES  No Known Allergies     Patient Active Problem List    Diagnosis Date Noted    Lumbar spine pain 07/10/2024    Low back pain 07/09/2024     No past medical history on file.   Past Surgical History:   Procedure Laterality Date    RHINOPLASTY  10/28/2014    Rhinoplasty      Family History   Problem Relation Name Age of Onset    Breast cancer Father's Sister  47    Breast cancer Maternal Grandmother  58          SOCIAL HISTORY      Social History     Tobacco Use    Smoking status: Never     Passive exposure: Never    Smokeless tobacco: Never   Substance Use Topics    Alcohol use: Yes        VITALS  There were no vitals filed for this visit.     PHYSICAL EXAM  Patient is alert and oriented x3, with appropriate mood. The gait is steady and hand grasps are equal. Sclera clear. The breasts are nearly symmetrical. The tissue is soft without palpable abnormalities, discrete nodules or masses. The skin and nipples appear normal. There is no cervical, supraclavicular, or axillary lymphadenopathy palpable. Heart rate and rhythm normal, S1 and S2 appreciated. The lungs are clear bilaterally. Abdomen is soft & non-tender.    Physical Exam     IMAGING    Time was spent viewing digital images of the radiology testing with the patient.     RISK PROFILE      ORDERS  No orders of the defined types were placed in this encounter.         ASSESSMENT/PLAN  No diagnosis found.       No follow-ups on file.      TOM [facility]

## 2025-02-07 ENCOUNTER — DOCUMENTATION (OUTPATIENT)
Dept: ENDOCRINOLOGY | Facility: CLINIC | Age: 35
End: 2025-02-07
Payer: COMMERCIAL

## 2025-02-07 RX ORDER — CHORIONIC GONADOTROPIN 10000 UNIT
10000 KIT INTRAMUSCULAR ONCE AS NEEDED
Qty: 1 EACH | Refills: 0 | Status: SHIPPED | OUTPATIENT
Start: 2025-02-07

## 2025-02-07 RX ORDER — LEUPROLIDE ACETATE 1 MG/0.2ML
4 KIT SUBCUTANEOUS AS NEEDED
Qty: 1 KIT | Refills: 0 | Status: SHIPPED | OUTPATIENT
Start: 2025-02-07

## 2025-02-07 RX ORDER — ESTRADIOL 2 MG/1
2 TABLET ORAL 2 TIMES DAILY
Qty: 60 TABLET | Refills: 0 | Status: SHIPPED | OUTPATIENT
Start: 2025-02-07 | End: 2026-02-07

## 2025-02-07 RX ORDER — GANIRELIX ACETATE 250 UG/.5ML
250 INJECTION, SOLUTION SUBCUTANEOUS EVERY MORNING
Qty: 5 EACH | Refills: 2 | Status: SHIPPED | OUTPATIENT
Start: 2025-02-07

## 2025-02-07 NOTE — PROGRESS NOTES
Called to review labs. A1c of 5.5 and elevated DHEAS however AMH is not in PCOS range but rather NARESH range. Reviewed that this may mean lower response to gonadotropin stim and likely more cycles of oocyte banking to achieve the desired number.   Reviewed that sometimes we cannot get the ovaries to stimulate sufficiently for retrieval.     Fertility Plan Update:   Updated Protocol: luteal estrace lead in with ANT  and menopur 150  Adjuncts: None (no letrozole as previously discussed due to NARESH)    Sharyn Stoner 02/07/25 2:52 PM

## 2025-02-07 NOTE — PROGRESS NOTES
Phone call to patient at the request of Dr. Stoner. Boarding pass complete pending consent forms which she will complete. Benefits are not yet verified because she just had egg freeze consult. Unsure if patient has IVF benefits. Told patient that she may have coverage and we do need time to verify and I can have them verified next week. She states that she is anxious to get started and is willing to be self pay in order to start estrace this weekend. Self pay estimate sent to patient via [employer]. She is aware that once she proceeds down a self pay path, she cannot change her mind if she learns she has benefits. She sent a [employer] message back indicating she will proceed as self pay for this cycle.  Margaret Crane 02/07/25 4:20 PM

## 2025-02-08 ENCOUNTER — LAB REQUISITION (OUTPATIENT)
Dept: LAB | Facility: HOSPITAL | Age: 35
End: 2025-02-08
Payer: COMMERCIAL

## 2025-02-08 DIAGNOSIS — Z31.41 ENCOUNTER FOR FERTILITY TESTING: ICD-10-CM

## 2025-02-08 DIAGNOSIS — N97.9 FEMALE INFERTILITY, UNSPECIFIED: ICD-10-CM

## 2025-02-08 LAB
ESTRADIOL SERPL-MCNC: 113 PG/ML
PROGEST SERPL-MCNC: 15 NG/ML

## 2025-02-08 PROCEDURE — 82670 ASSAY OF TOTAL ESTRADIOL: CPT

## 2025-02-08 PROCEDURE — 84144 ASSAY OF PROGESTERONE: CPT

## 2025-02-08 NOTE — PROGRESS NOTES
Patient to present to lab today for CD 21 p4 level to start Estrace lead in for egg freezing cycle. Will review results with provider and contact patient with plan.  Polina Hoff 02/08/25 8:53 AM     Component      Latest Ref Rng 2/8/2025   Progesterone      ng/mL 15.0            [facility]  [facility]ound and/or labs reviewed at [facility].   Results for orders placed or performed in visit on 02/08/25 (from the past 96 hours)   Progesterone   Result Value Ref Range    Progesterone 15.0 ng/mL   Estradiol   Result Value Ref Range    Estradiol 113 pg/mL     None    Progesterone indicates recent ovulation. OK to start luteal estrace for upcoming cycle.    Jean SANTANA Pearl  02/08/2025  11:40 AM    [employer] message sent to patient instructing her to start her luteal Estrace this evening as lab work confirmed recent ovulation. Also instructed patient to call the office when she gets her period to get scheduled for her baseline appointment. Patient to let RN know availability to get scheduled for nurse teaching appointment. Instructed patient to contact [facility] to obtain medications prior to baseline. Encouraged patient to call office with any questions or concerns.  Polina Hoff 02/08/25 12:25 PM

## 2025-02-09 LAB — TSH SERPL-ACNC: 2.04 MIU/L

## 2025-02-10 ENCOUNTER — APPOINTMENT (OUTPATIENT)
Dept: SURGICAL ONCOLOGY | Facility: CLINIC | Age: 35
End: 2025-02-10
Payer: COMMERCIAL

## 2025-02-10 ENCOUNTER — SPECIALTY PHARMACY (OUTPATIENT)
Dept: PHARMACY | Facility: CLINIC | Age: 35
End: 2025-02-10

## 2025-02-10 ENCOUNTER — TELEPHONE (OUTPATIENT)
Dept: ENDOCRINOLOGY | Facility: CLINIC | Age: 35
End: 2025-02-10
Payer: COMMERCIAL

## 2025-02-10 ENCOUNTER — PHARMACY VISIT (OUTPATIENT)
Dept: PHARMACY | Facility: CLINIC | Age: 35
End: 2025-02-10
Payer: COMMERCIAL

## 2025-02-10 PROCEDURE — RXMED WILLOW AMBULATORY MEDICATION CHARGE

## 2025-02-10 NOTE — TELEPHONE ENCOUNTER
Reason for call: Patient is calling to schedule nurse teaching for her inj meds please let me know when I should bring her.  Notes:

## 2025-02-10 NOTE — TELEPHONE ENCOUNTER
Patient scheduled for nurse teaching appointment for tomorrow 2/11 at 9am with MARLY Aranda. Patient LVM with pharmacy today to obtain medications.   Polina Hoff 02/10/25 11:19 AM

## 2025-02-11 ENCOUNTER — APPOINTMENT (OUTPATIENT)
Dept: ENDOCRINOLOGY | Facility: CLINIC | Age: 35
End: 2025-02-11
Payer: COMMERCIAL

## 2025-02-17 ENCOUNTER — TELEPHONE (OUTPATIENT)
Dept: ENDOCRINOLOGY | Facility: CLINIC | Age: 35
End: 2025-02-17
Payer: COMMERCIAL

## 2025-02-17 DIAGNOSIS — N97.9 FEMALE INFERTILITY: ICD-10-CM

## 2025-02-17 NOTE — TELEPHONE ENCOUNTER
----- Message from Nurse Lenin ROWE sent at 2/17/2025 10:00 AM EST -----  Cleared to schedule  ----- Message -----  From: Radha Plascencia RN  Sent: 2/17/2025   8:29 AM EST  To: Lenin Chang RN; #    Patient needs to come in TOMORROW for IVF baseline. Please verify financial clearance and call to schedule- Follicle scan, e2 cbc

## 2025-02-17 NOTE — TELEPHONE ENCOUNTER
Returned patient call regarding LMP- 2/16/25. Patient instructed to complete Engaged MD forms ASAP for BP clearance. Patient states she has all IVF medications. Patient instructed to RTC tomorrow for IVF baseline and FD will call to schedule baseline after verifying financial clearance. Confirmed patient is on start calendar. Message sent to FD. BP to be reviewed later this afternoon.   ROBBIN CORTEZ on 2/17/25 at 8:32 AM.

## 2025-02-18 ENCOUNTER — ANCILLARY PROCEDURE (OUTPATIENT)
Dept: ENDOCRINOLOGY | Facility: CLINIC | Age: 35
End: 2025-02-18
Payer: COMMERCIAL

## 2025-02-18 ENCOUNTER — LAB REQUISITION (OUTPATIENT)
Dept: LAB | Facility: HOSPITAL | Age: 35
End: 2025-02-18
Payer: COMMERCIAL

## 2025-02-18 ENCOUNTER — SPECIALTY PHARMACY (OUTPATIENT)
Dept: PHARMACY | Facility: CLINIC | Age: 35
End: 2025-02-18

## 2025-02-18 DIAGNOSIS — N97.9 FEMALE INFERTILITY: ICD-10-CM

## 2025-02-18 DIAGNOSIS — N97.9 FEMALE INFERTILITY, UNSPECIFIED: ICD-10-CM

## 2025-02-18 LAB
ERYTHROCYTE [DISTWIDTH] IN BLOOD BY AUTOMATED COUNT: 13.5 % (ref 11.5–14.5)
ESTRADIOL SERPL-MCNC: 111 PG/ML
HCT VFR BLD AUTO: 39.9 % (ref 36–46)
HGB BLD-MCNC: 13 G/DL (ref 12–16)
MCH RBC QN AUTO: 29.1 PG (ref 26–34)
MCHC RBC AUTO-ENTMCNC: 32.6 G/DL (ref 32–36)
MCV RBC AUTO: 89 FL (ref 80–100)
NRBC BLD-RTO: 0 /100 WBCS (ref 0–0)
PLATELET # BLD AUTO: 262 X10*3/UL (ref 150–450)
RBC # BLD AUTO: 4.47 X10*6/UL (ref 4–5.2)
WBC # BLD AUTO: 5.8 X10*3/UL (ref 4.4–11.3)

## 2025-02-18 PROCEDURE — 76857 US EXAM PELVIC LIMITED: CPT | Performed by: OBSTETRICS & GYNECOLOGY

## 2025-02-18 PROCEDURE — 85027 COMPLETE CBC AUTOMATED: CPT

## 2025-02-18 PROCEDURE — 76857 US EXAM PELVIC LIMITED: CPT

## 2025-02-18 PROCEDURE — 82670 ASSAY OF TOTAL ESTRADIOL: CPT

## 2025-02-18 NOTE — PROGRESS NOTES
CYCLING NOTE    Here for US and/or lab monitoring; relevant findings reviewed. Patient is 34 years old. Patient of Dr. Ngo. AMH- 0.28. Patient is here for IVF cycle #1 for egg freezing. Protocol- LE lead in/antagonist. Patient took last estrace yesterday evening.     CONTACT DR. NGO IF CONCERNS WITH STIMULATION     Patient stayed for nurse visit. Pain is 0/10  Team will contact patient later today with results and plan.    Radha Plascencia  02/18/2025  9:36 AM    HUDDLE NOTE - IVF STIM BASELINE  Patient presents for baseline ultrasound and/or labs.    Treatment protocol: luteal estrace lead in antagonist with  and menopur 150   Trigger plan:  TBD  Pre-retrieval meds: Antibiotics per protocol  Adjuncts: Adjuncts: None (no letrozole as previously discussed due to NARESH)   Lead in: Estrace   Start date for lead in: 2/8/25  Last estrace/OCP date: 2/17/25  PGT-A/M? No  Plan to freeze: mature oocytes    [employer] forms/out waiver complete:  Yes    Does patient have medications onhand?  Yes  Pharmacy: [facility]     Boarding pass signed off:  Yes By Dr. White on 2/17/25    CBC reviewed by MD?  Yes by EM    Date of Female STDs: 1/30/25    Date of Male STDs: N/A    Medically complex on boarding pass:   no    If indicated, is PAT consult complete?  N/A    SPERM:  na    Component  Ref Range & Units 09:52 1 yr ago 2 yr ago 5 yr ago 6 yr ago   WBC  4.4 - 11.3 x10*3/uL 5.8 6.3 R 4.9 R 4.1 Low  R 7.0 R   nRBC  0.0 - 0.0 /100 WBCs 0.0 0.0 R  0.0 R 0.0 R   RBC  4.00 - 5.20 x10*6/uL 4.47 4.48 R 4.63 R 4.26 R 4.69 R   Hemoglobin  12.0 - 16.0 g/dL 13.0 13.5 13.7 13.1 14.5   Hematocrit  36.0 - 46.0 % 39.9 41.9 41.8 40.0 44.2   MCV  80 - 100 fL 89 94 90 94 94   MCH  26.0 - 34.0 pg 29.1       MCHC  32.0 - 36.0 g/dL 32.6 32.2 32.8 32.8 32.8   RDW  11.5 - 14.5 % 13.5 12.2 12.8 13.5 13.0   Platelets  150 - 450 x10*3/uL 262 270 R 265 R 180 R 233 R       Additional details: N/A  Radha Plascencia  02/18/2025  9:36 AM    Plan sent via  my chart. Message sent to  for scheduling.   ROBBIN CORTEZ on 2/18/25 at 1:41 PM.

## 2025-02-21 ENCOUNTER — ANCILLARY PROCEDURE (OUTPATIENT)
Dept: ENDOCRINOLOGY | Facility: CLINIC | Age: 35
End: 2025-02-21
Payer: COMMERCIAL

## 2025-02-21 ENCOUNTER — LAB REQUISITION (OUTPATIENT)
Dept: LAB | Facility: HOSPITAL | Age: 35
End: 2025-02-21
Payer: COMMERCIAL

## 2025-02-21 DIAGNOSIS — N97.9 FEMALE INFERTILITY: ICD-10-CM

## 2025-02-21 DIAGNOSIS — N97.9 FEMALE INFERTILITY, UNSPECIFIED: ICD-10-CM

## 2025-02-21 LAB — ESTRADIOL SERPL-MCNC: 46 PG/ML

## 2025-02-21 PROCEDURE — 82670 ASSAY OF TOTAL ESTRADIOL: CPT

## 2025-02-21 PROCEDURE — 76857 US EXAM PELVIC LIMITED: CPT | Performed by: OBSTETRICS & GYNECOLOGY

## 2025-02-21 PROCEDURE — 76857 US EXAM PELVIC LIMITED: CPT

## 2025-02-21 NOTE — PROGRESS NOTES
CYCLING NOTE  Cycle #: 1- egg freezing   Reason For Treatment: fertility preservation   Protocol: Antagonist FSH-300 ,MEN=150  Day of stim: back after 3 days of meds  Patient Hx: 34 patient of Dr. Ngo, AMH-.28  Notes:  CONTACT DR. NGO IF CONCERNS WITH STIMULATION      Here for US and/or lab monitoring; relevant findings reviewed.    Patient did not stay for discussion after monitoring,  Team will contact patient later today with results and plan.    Rosi Bowen  02/21/2025  9:14 AM    Called patient with plan.   Patient to continue all medications at current doses.  To return to office on Monday for follicle scan and E2.    Rosi Bowen 02/21/25 1:42 PM

## 2025-02-24 ENCOUNTER — ANCILLARY PROCEDURE (OUTPATIENT)
Dept: ENDOCRINOLOGY | Facility: CLINIC | Age: 35
End: 2025-02-24
Payer: COMMERCIAL

## 2025-02-24 ENCOUNTER — LAB REQUISITION (OUTPATIENT)
Dept: LAB | Facility: HOSPITAL | Age: 35
End: 2025-02-24

## 2025-02-24 DIAGNOSIS — N97.9 FEMALE INFERTILITY, UNSPECIFIED: ICD-10-CM

## 2025-02-24 DIAGNOSIS — N97.9 FEMALE INFERTILITY: ICD-10-CM

## 2025-02-24 LAB — ESTRADIOL SERPL-MCNC: 163 PG/ML

## 2025-02-24 PROCEDURE — 82670 ASSAY OF TOTAL ESTRADIOL: CPT

## 2025-02-24 PROCEDURE — 76857 US EXAM PELVIC LIMITED: CPT | Performed by: OBSTETRICS & GYNECOLOGY

## 2025-02-24 PROCEDURE — 76857 US EXAM PELVIC LIMITED: CPT

## 2025-02-24 NOTE — PROGRESS NOTES
CYCLING NOTE    Here for US and/or lab monitoring; relevant findings reviewed. 34 year old patient of Dr. Stoner with AMH 0.28 is here to monitor for egg freezing #1 (elective) after 6 days of injections.     Treatment protocol: luteal estrace lead in antagonist with  and menopur 150     Patient did not stay for discussion after monitoring,  Team will contact patient later today with results and plan.    CARLOS MCDONALD  02/24/2025  9:37 AM      [employer] message sent to patient to review plan as documented in stimulation summary.   CARLOS MCDONALD RN 02/24/2025 14:08

## 2025-02-26 ENCOUNTER — LAB REQUISITION (OUTPATIENT)
Dept: LAB | Facility: HOSPITAL | Age: 35
End: 2025-02-26
Payer: COMMERCIAL

## 2025-02-26 ENCOUNTER — SPECIALTY PHARMACY (OUTPATIENT)
Dept: PHARMACY | Facility: CLINIC | Age: 35
End: 2025-02-26

## 2025-02-26 ENCOUNTER — ANCILLARY PROCEDURE (OUTPATIENT)
Dept: ENDOCRINOLOGY | Facility: CLINIC | Age: 35
End: 2025-02-26
Payer: COMMERCIAL

## 2025-02-26 ENCOUNTER — PHARMACY VISIT (OUTPATIENT)
Dept: PHARMACY | Facility: CLINIC | Age: 35
End: 2025-02-26
Payer: COMMERCIAL

## 2025-02-26 DIAGNOSIS — Z31.41 ENCOUNTER FOR FERTILITY TESTING: ICD-10-CM

## 2025-02-26 DIAGNOSIS — N97.9 FEMALE INFERTILITY: ICD-10-CM

## 2025-02-26 LAB
ESTRADIOL SERPL-MCNC: 375 PG/ML
PROGEST SERPL-MCNC: 0.3 NG/ML

## 2025-02-26 PROCEDURE — 76857 US EXAM PELVIC LIMITED: CPT

## 2025-02-26 PROCEDURE — RXMED WILLOW AMBULATORY MEDICATION CHARGE

## 2025-02-26 PROCEDURE — 84144 ASSAY OF PROGESTERONE: CPT

## 2025-02-26 PROCEDURE — 82670 ASSAY OF TOTAL ESTRADIOL: CPT

## 2025-02-26 PROCEDURE — 76857 US EXAM PELVIC LIMITED: CPT | Performed by: OBSTETRICS & GYNECOLOGY

## 2025-02-26 NOTE — PROGRESS NOTES
CYCLING NOTE    Here for US and/or lab monitoring; relevant findings reviewed. Patient is 34 years old. Patient of Dr. Ngo. AMH- 0.28. Patient is here for IVF cycle #1 for egg freezing. Protocol- LE lead in/antagonist. Patient is day 9 of stimulation.      CONTACT DR. NGO IF CONCERNS WITH STIMULATION     Patient did not stay for discussion after monitoring,  Team will contact patient later today with results and plan.    Robbin Plascencia  02/26/2025  12:47 PM    My chart message sent to patient with plan. Message sent to FD for scheduling.   ROBBIN PLASCENCIA on 2/26/25 at 2:20 PM.

## 2025-02-27 DIAGNOSIS — N97.9 FEMALE INFERTILITY: ICD-10-CM

## 2025-02-28 ENCOUNTER — PHARMACY VISIT (OUTPATIENT)
Dept: PHARMACY | Facility: CLINIC | Age: 35
End: 2025-02-28
Payer: COMMERCIAL

## 2025-02-28 ENCOUNTER — LAB REQUISITION (OUTPATIENT)
Dept: LAB | Facility: HOSPITAL | Age: 35
End: 2025-02-28
Payer: COMMERCIAL

## 2025-02-28 ENCOUNTER — SPECIALTY PHARMACY (OUTPATIENT)
Dept: PHARMACY | Facility: CLINIC | Age: 35
End: 2025-02-28

## 2025-02-28 ENCOUNTER — ANCILLARY PROCEDURE (OUTPATIENT)
Dept: ENDOCRINOLOGY | Facility: CLINIC | Age: 35
End: 2025-02-28
Payer: COMMERCIAL

## 2025-02-28 DIAGNOSIS — N97.9 FEMALE INFERTILITY: ICD-10-CM

## 2025-02-28 DIAGNOSIS — N97.9 FEMALE INFERTILITY, UNSPECIFIED: ICD-10-CM

## 2025-02-28 LAB
ESTRADIOL SERPL-MCNC: 700 PG/ML
PROGEST SERPL-MCNC: 0.6 NG/ML

## 2025-02-28 PROCEDURE — 82670 ASSAY OF TOTAL ESTRADIOL: CPT

## 2025-02-28 PROCEDURE — 76857 US EXAM PELVIC LIMITED: CPT | Performed by: OBSTETRICS & GYNECOLOGY

## 2025-02-28 PROCEDURE — 84144 ASSAY OF PROGESTERONE: CPT

## 2025-02-28 PROCEDURE — RXMED WILLOW AMBULATORY MEDICATION CHARGE

## 2025-02-28 PROCEDURE — 76857 US EXAM PELVIC LIMITED: CPT

## 2025-02-28 NOTE — PROGRESS NOTES
CYCLING NOTE  Cycle #: 1  Reason For Treatment: fertility preservation   Protocol: antagonist with  and menopur 150   Day of stim: 10th day of medications  Patient Hx: 33 yo patient of Dr. Stoner, AMH-.28    Here for US and/or lab monitoring; relevant findings reviewed.    Patient did not stay for discussion after monitoring,  Team will contact patient later today with results and plan.    Rosi Bowen  02/28/2025  12:17 PM    "[employer] message sent to patient with plan to return to office tomorrow for follicle scan and lab work.     Rosi Bowen 02/28/25 1:26 PM

## 2025-03-01 ENCOUNTER — ANCILLARY PROCEDURE (OUTPATIENT)
Dept: ENDOCRINOLOGY | Facility: CLINIC | Age: 35
End: 2025-03-01
Payer: COMMERCIAL

## 2025-03-01 ENCOUNTER — LAB REQUISITION (OUTPATIENT)
Dept: LAB | Facility: HOSPITAL | Age: 35
End: 2025-03-01

## 2025-03-01 DIAGNOSIS — N97.9 FEMALE INFERTILITY, UNSPECIFIED: ICD-10-CM

## 2025-03-01 DIAGNOSIS — N97.9 FEMALE INFERTILITY: ICD-10-CM

## 2025-03-01 LAB
ESTRADIOL SERPL-MCNC: 966 PG/ML
LH SERPL-ACNC: 6.5 IU/L
PROGEST SERPL-MCNC: 1 NG/ML

## 2025-03-01 PROCEDURE — 76857 US EXAM PELVIC LIMITED: CPT

## 2025-03-01 PROCEDURE — 76857 US EXAM PELVIC LIMITED: CPT | Performed by: OBSTETRICS & GYNECOLOGY

## 2025-03-01 PROCEDURE — 84144 ASSAY OF PROGESTERONE: CPT

## 2025-03-01 PROCEDURE — 83002 ASSAY OF GONADOTROPIN (LH): CPT

## 2025-03-01 PROCEDURE — 82670 ASSAY OF TOTAL ESTRADIOL: CPT

## 2025-03-01 NOTE — PROGRESS NOTES
CYCLING NOTE    Here for US and/or lab monitoring; relevant findings reviewed.    Cycle #: 1  Reason For Treatment: fertility preservation   Protocol: antagonist with  and menopur 150   Day of stim: 11  Patient Hx: 33 yo patient of Dr. Stoner, AMH-.28     Patient stayed for nurse visit. Pain is 0/10  Team will contact patient later today with results and plan.    Rajiv Suazo  03/01/2025  10:01 AM    Telephone call made to patient with MD plan, voicemail box received, detailed voicemail left for patient. Very detailed MyChart sent to patient. Patient made aware to take an additional dose of  units tonight and trigger with HCG at 830 and to take a home UPT tomorrow. Patient aware to arrive Monday at 0730. Staff message sent to RYAN borrego, and IVF lab.    03/01/25 at 12:32 PM - Keena Blair RN

## 2025-03-02 ENCOUNTER — PREP FOR PROCEDURE (OUTPATIENT)
Dept: ENDOCRINOLOGY | Facility: CLINIC | Age: 35
End: 2025-03-02
Payer: COMMERCIAL

## 2025-03-02 RX ORDER — OXYCODONE AND ACETAMINOPHEN 5; 325 MG/1; MG/1
1 TABLET ORAL EVERY 6 HOURS PRN
Status: CANCELLED | OUTPATIENT
Start: 2025-03-02

## 2025-03-02 RX ORDER — KETOROLAC TROMETHAMINE 30 MG/ML
30 INJECTION, SOLUTION INTRAMUSCULAR; INTRAVENOUS ONCE
Status: CANCELLED | OUTPATIENT
Start: 2025-03-02 | End: 2025-03-02

## 2025-03-02 RX ORDER — ONDANSETRON HYDROCHLORIDE 2 MG/ML
4 INJECTION, SOLUTION INTRAVENOUS AS NEEDED
Status: CANCELLED | OUTPATIENT
Start: 2025-03-02

## 2025-03-02 RX ORDER — ACETAMINOPHEN 325 MG/1
650 TABLET ORAL ONCE AS NEEDED
Status: CANCELLED | OUTPATIENT
Start: 2025-03-02

## 2025-03-02 RX ORDER — MORPHINE SULFATE 2 MG/ML
2 INJECTION, SOLUTION INTRAMUSCULAR; INTRAVENOUS AS NEEDED
Status: CANCELLED | OUTPATIENT
Start: 2025-03-02

## 2025-03-02 RX ORDER — KETOROLAC TROMETHAMINE 30 MG/ML
30 INJECTION, SOLUTION INTRAMUSCULAR; INTRAVENOUS ONCE AS NEEDED
Status: CANCELLED | OUTPATIENT
Start: 2025-03-02 | End: 2025-03-07

## 2025-03-02 RX ORDER — HYDROMORPHONE HYDROCHLORIDE 0.2 MG/ML
0.2 INJECTION INTRAMUSCULAR; INTRAVENOUS; SUBCUTANEOUS
Status: CANCELLED | OUTPATIENT
Start: 2025-03-02

## 2025-03-02 RX ORDER — HYDROCODONE BITARTRATE AND ACETAMINOPHEN 5; 325 MG/1; MG/1
1 TABLET ORAL ONCE AS NEEDED
Status: CANCELLED | OUTPATIENT
Start: 2025-03-02

## 2025-03-03 ENCOUNTER — HOSPITAL ENCOUNTER (OUTPATIENT)
Dept: ENDOCRINOLOGY | Facility: CLINIC | Age: 35
Discharge: HOME | End: 2025-03-03

## 2025-03-03 ENCOUNTER — ANESTHESIA (OUTPATIENT)
Dept: ENDOCRINOLOGY | Facility: CLINIC | Age: 35
End: 2025-03-03

## 2025-03-03 ENCOUNTER — DOCUMENTATION (OUTPATIENT)
Dept: ENDOCRINOLOGY | Facility: CLINIC | Age: 35
End: 2025-03-03
Payer: COMMERCIAL

## 2025-03-03 ENCOUNTER — ANESTHESIA EVENT (OUTPATIENT)
Dept: ENDOCRINOLOGY | Facility: CLINIC | Age: 35
End: 2025-03-03

## 2025-03-03 VITALS
SYSTOLIC BLOOD PRESSURE: 107 MMHG | TEMPERATURE: 97.9 F | DIASTOLIC BLOOD PRESSURE: 63 MMHG | OXYGEN SATURATION: 99 % | HEIGHT: 69 IN | WEIGHT: 200.62 LBS | HEART RATE: 62 BPM | RESPIRATION RATE: 17 BRPM | BODY MASS INDEX: 29.71 KG/M2

## 2025-03-03 DIAGNOSIS — Z31.83 ENCOUNTER FOR ASSISTED REPRODUCTIVE FERTILITY CYCLE: ICD-10-CM

## 2025-03-03 LAB — PREGNANCY TEST URINE, POC: POSITIVE

## 2025-03-03 PROCEDURE — A58970 PR FOLLICLE PUNC,RETRIEVAL OF OOCYTE: Performed by: ANESTHESIOLOGY

## 2025-03-03 PROCEDURE — 3700000001 HC GENERAL ANESTHESIA TIME - INITIAL BASE CHARGE

## 2025-03-03 PROCEDURE — 7100000010 HC PHASE TWO TIME - EACH INCREMENTAL 1 MINUTE

## 2025-03-03 PROCEDURE — 58970 RETRIEVAL OF OOCYTE: CPT | Performed by: OBSTETRICS & GYNECOLOGY

## 2025-03-03 PROCEDURE — 7100000009 HC PHASE TWO TIME - INITIAL BASE CHARGE

## 2025-03-03 PROCEDURE — 3700000002 HC GENERAL ANESTHESIA TIME - EACH INCREMENTAL 1 MINUTE

## 2025-03-03 PROCEDURE — 76948 ECHO GUIDE OVA ASPIRATION: CPT | Performed by: OBSTETRICS & GYNECOLOGY

## 2025-03-03 PROCEDURE — 89337 CRYOPRESERVATION OOCYTE(S): CPT | Performed by: OBSTETRICS & GYNECOLOGY

## 2025-03-03 PROCEDURE — 2500000004 HC RX 250 GENERAL PHARMACY W/ HCPCS (ALT 636 FOR OP/ED): Performed by: ANESTHESIOLOGIST ASSISTANT

## 2025-03-03 PROCEDURE — A58970 PR FOLLICLE PUNC,RETRIEVAL OF OOCYTE: Performed by: ANESTHESIOLOGIST ASSISTANT

## 2025-03-03 RX ORDER — CEFAZOLIN 1 G/1
INJECTION, POWDER, FOR SOLUTION INTRAVENOUS AS NEEDED
Status: DISCONTINUED | OUTPATIENT
Start: 2025-03-03 | End: 2025-03-03

## 2025-03-03 RX ORDER — KETOROLAC TROMETHAMINE 30 MG/ML
30 INJECTION, SOLUTION INTRAMUSCULAR; INTRAVENOUS ONCE
Status: DISCONTINUED | OUTPATIENT
Start: 2025-03-03 | End: 2025-03-04 | Stop reason: HOSPADM

## 2025-03-03 RX ORDER — FENTANYL CITRATE 50 UG/ML
INJECTION, SOLUTION INTRAMUSCULAR; INTRAVENOUS AS NEEDED
Status: DISCONTINUED | OUTPATIENT
Start: 2025-03-03 | End: 2025-03-03

## 2025-03-03 RX ORDER — PROPOFOL 10 MG/ML
INJECTION, EMULSION INTRAVENOUS CONTINUOUS PRN
Status: DISCONTINUED | OUTPATIENT
Start: 2025-03-03 | End: 2025-03-03

## 2025-03-03 RX ORDER — ONDANSETRON HYDROCHLORIDE 2 MG/ML
4 INJECTION, SOLUTION INTRAVENOUS AS NEEDED
Status: DISCONTINUED | OUTPATIENT
Start: 2025-03-03 | End: 2025-03-04 | Stop reason: HOSPADM

## 2025-03-03 RX ORDER — KETOROLAC TROMETHAMINE 30 MG/ML
30 INJECTION, SOLUTION INTRAMUSCULAR; INTRAVENOUS ONCE AS NEEDED
Status: DISCONTINUED | OUTPATIENT
Start: 2025-03-03 | End: 2025-03-04 | Stop reason: HOSPADM

## 2025-03-03 RX ORDER — HYDROCODONE BITARTRATE AND ACETAMINOPHEN 5; 325 MG/1; MG/1
1 TABLET ORAL ONCE AS NEEDED
Status: DISCONTINUED | OUTPATIENT
Start: 2025-03-03 | End: 2025-03-04 | Stop reason: HOSPADM

## 2025-03-03 RX ORDER — MORPHINE SULFATE 2 MG/ML
2 INJECTION, SOLUTION INTRAMUSCULAR; INTRAVENOUS AS NEEDED
Status: DISCONTINUED | OUTPATIENT
Start: 2025-03-03 | End: 2025-03-04 | Stop reason: HOSPADM

## 2025-03-03 RX ORDER — ACETAMINOPHEN 325 MG/1
650 TABLET ORAL ONCE AS NEEDED
Status: DISCONTINUED | OUTPATIENT
Start: 2025-03-03 | End: 2025-03-04 | Stop reason: HOSPADM

## 2025-03-03 RX ORDER — OXYCODONE AND ACETAMINOPHEN 5; 325 MG/1; MG/1
1 TABLET ORAL EVERY 6 HOURS PRN
Status: DISCONTINUED | OUTPATIENT
Start: 2025-03-03 | End: 2025-03-04 | Stop reason: HOSPADM

## 2025-03-03 RX ORDER — MIDAZOLAM HYDROCHLORIDE 1 MG/ML
INJECTION, SOLUTION INTRAMUSCULAR; INTRAVENOUS AS NEEDED
Status: DISCONTINUED | OUTPATIENT
Start: 2025-03-03 | End: 2025-03-03

## 2025-03-03 RX ORDER — HYDROMORPHONE HYDROCHLORIDE 2 MG/ML
0.2 INJECTION, SOLUTION INTRAMUSCULAR; INTRAVENOUS; SUBCUTANEOUS
Status: DISCONTINUED | OUTPATIENT
Start: 2025-03-03 | End: 2025-03-04 | Stop reason: HOSPADM

## 2025-03-03 RX ADMIN — SODIUM CHLORIDE, SODIUM LACTATE, POTASSIUM CHLORIDE, AND CALCIUM CHLORIDE: .6; .31; .03; .02 INJECTION, SOLUTION INTRAVENOUS at 08:33

## 2025-03-03 RX ADMIN — PROPOFOL 200 MCG/KG/MIN: 10 INJECTION, EMULSION INTRAVENOUS at 08:36

## 2025-03-03 RX ADMIN — CEFAZOLIN 2 G: 330 INJECTION, POWDER, FOR SOLUTION INTRAMUSCULAR; INTRAVENOUS at 08:38

## 2025-03-03 RX ADMIN — FENTANYL CITRATE 100 MCG: 50 INJECTION, SOLUTION INTRAMUSCULAR; INTRAVENOUS at 08:34

## 2025-03-03 RX ADMIN — MIDAZOLAM 2 MG: 1 INJECTION INTRAMUSCULAR; INTRAVENOUS at 08:34

## 2025-03-03 ASSESSMENT — PAIN - FUNCTIONAL ASSESSMENT
PAIN_FUNCTIONAL_ASSESSMENT: 0-10
PAIN_FUNCTIONAL_ASSESSMENT: UNABLE TO SELF-REPORT
PAIN_FUNCTIONAL_ASSESSMENT: 0-10
PAIN_FUNCTIONAL_ASSESSMENT: 0-10

## 2025-03-03 ASSESSMENT — PAIN SCALES - GENERAL
PAINLEVEL_OUTOF10: 0 - NO PAIN

## 2025-03-03 ASSESSMENT — COLUMBIA-SUICIDE SEVERITY RATING SCALE - C-SSRS
1. IN THE PAST MONTH, HAVE YOU WISHED YOU WERE DEAD OR WISHED YOU COULD GO TO SLEEP AND NOT WAKE UP?: NO
6. HAVE YOU EVER DONE ANYTHING, STARTED TO DO ANYTHING, OR PREPARED TO DO ANYTHING TO END YOUR LIFE?: NO
2. HAVE YOU ACTUALLY HAD ANY THOUGHTS OF KILLING YOURSELF?: NO

## 2025-03-03 NOTE — ANESTHESIA POSTPROCEDURE EVALUATION
Patient: Susana Rivera    Procedure Summary       Date: 03/03/25 [facility]    Anesthesia Start: 0833 Anesthesia Stop: 0913    Procedure: EGG RETRIEVAL Diagnosis: Encounter for assisted reproductive fertility cycle    Scheduled Providers: Jean Kang MD; Earl Guido MD; SANIA Waggoner Responsible Provider: Earl Guido MD    Anesthesia Type: MAC ASA Status: 1            Anesthesia Type: MAC    Vitals Value Taken Time   /63 03/03/25 0938   Temp 36.6 °C (97.9 °F) 03/03/25 0938   Pulse 62 03/03/25 0938   Resp 17 03/03/25 0938   SpO2 99 % 03/03/25 0938       Anesthesia Post Evaluation    Patient location during evaluation: PACU  Patient participation: complete - patient participated  Level of consciousness: awake and alert  Pain management: adequate  Airway patency: patent  Cardiovascular status: acceptable and hemodynamically stable  Respiratory status: acceptable, spontaneous ventilation and nonlabored ventilation  Hydration status: acceptable  Postoperative Nausea and Vomiting: none        There were no known notable events for this encounter.

## 2025-03-03 NOTE — PROGRESS NOTES
Patient ID: Susana Rivera is a 34 y.o. female.    Egg Retrieval    Date/Time: 3/3/2025 9:00 AM    Performed by: Jean Kang MD  Authorized by: Sharyn Stoner MD    Consent:     Consent obtained:  Verbal and written    Consent given by:  Patient    Procedure risks and benefits discussed: yes      Patient questions answered: yes      Patient agrees, verbalizes understanding, and wants to proceed: yes      Educational handouts given: yes      Instructions and paperwork completed: yes    Procedure:     Anesthesia:  MAC    Pelvic exam performed: Yes      Cervix cleaned and prepped: Yes      Speculum placed in vagina: Yes      Tenaculum applied to cervix: No      Ultrasound guidance: Yes      Left ovary:  Follicle present    Right ovary:  Follicle present    Pt. post-ovulatory: No      Speculum type: Cyn      Retrieval method: transvaginal      Needle inserted: Yes      Ovaries aspirated: Yes      Free fluid in pelvis: No    Post-procedure:     Patient tolerated procedure well: yes    Comments:      Preop diagnosis: Female infertility   Post op diagnosis: Same  Assistant: Dr. Leavitt    IV Fluids: 600  cc  EBL: 5  cc  UOP: Not recorded    Specimen: Oocytes  Complications: None    Number of Oocytes right ovary: 2   Ovarian accèss (right): Easy  Number of Oocytes left ovary: 2  Ovarian access (left): Easy  Endometrial thickness: n/a  Needle type: Double  Additional notes:

## 2025-03-03 NOTE — DISCHARGE INSTRUCTIONS
[facility]  [address]. [place]  [address]   Tel: [phone]   Fax: [phone]    Home Going Instructions after Egg Retrieval:     Activity:   We do not recommend exercise on the day of or the day after your egg retrieval.   You can resume normal activities in 2-3 days, but please avoid exercise that involves jumping or bouncing.  If you are not having a fresh embryo transfer, you will likely start your period within 1-2 weeks and can resume all normal exercise at that time.   You may resume intercourse one week after your retrieval.     Anesthesia:  Drink small amounts of liquids initially and then slowly increase your intake of food on the day of your procedure. Drinking fluids will keep your bowels regular.   Avoid foods that are sweet, spicy or hard to digest today.  If you feel nauseated, rest your stomach for one hour, and then try drinking clear liquids again.  You may take a stool softener or miralax/milk of magnesia to help with constipation that may occur after anesthesia.  Please make sure a responsible adult is with you for at least 24 hours after surgery and do not drive or make important decisions during this time. Anesthesia may affect your judgment, coordination, and reaction time.    Follow up:  ALWAYS follow up with your primary nurse a few days after your procedure to check in and make sure you know what your next steps are.     When to call your provider:  Vaginal bleeding that is more than a normal period that doesn't taper down within 6 hours.  Saturating a sanitary pad in 1-2 hours.  Any clots the size of an egg or bigger.  Fever of 100.4 or higher with chills.  Unusual or foul smelling discharge.  Discomfort from abdominal distension.     Notify your Physician's office if you develop any signs of Ovarian Hyperstimulation (OHSS):    -Abdominal bloating   -Rapid weight gain of 5-10 lbs. in 1-2 days  -Swelling in  hands and feet  -Severe abdominal pain  -Shortness of breath   -Difficulty urinating or decreased urinary output   -Diarrhea    -Persistent nausea and vomiting  -Dizziness     These signs and symptoms can occur for up to 2 weeks following your oocyte retrieval. Call [phone] to speak with a Physician or Nurse if you have any concerns.    Yolanda Young    7:34 AM

## 2025-03-03 NOTE — ANESTHESIA PREPROCEDURE EVALUATION
"Patient: Susana Rivera    Procedure Information       Date/Time: 03/03/25 0830    Scheduled providers: Jean Kang MD; Earl Guido MD; SANIA Waggoner    Procedure: EGG RETRIEVAL    [facility]            Relevant Problems   No relevant active problems       Clinical information reviewed:   Tobacco  Allergies  Meds   Med Hx  Surg Hx   Fam Hx  Soc Hx         History reviewed. No pertinent past medical history.   Past Surgical History:   Procedure Laterality Date    RHINOPLASTY       Social History     Tobacco Use    Smoking status: Never     Passive exposure: Never    Smokeless tobacco: Never   Substance Use Topics    Alcohol use: Yes     Comment: socially    Drug use: Never      Current Outpatient Medications   Medication Instructions    albuterol 90 mcg/actuation inhaler 2 puffs, inhalation, Every 6 hours PRN      No Known Allergies     Chemistry    Lab Results   Component Value Date/Time     04/18/2023 1644    K 4.0 04/18/2023 1644     04/18/2023 1644    CO2 30 04/18/2023 1644    BUN 13 04/18/2023 1644    CREATININE 0.89 04/18/2023 1644    Lab Results   Component Value Date/Time    CALCIUM 9.3 04/18/2023 1644    ALKPHOS 73 04/18/2023 1644    AST 22 04/18/2023 1644    ALT 14 04/18/2023 1644    BILITOT 0.6 04/18/2023 1644          Lab Results   Component Value Date    HGBA1C 5.5 01/30/2025     Lab Results   Component Value Date/Time    WBC 5.8 02/18/2025 0952    HGB 13.0 02/18/2025 0952    HCT 39.9 02/18/2025 0952     02/18/2025 0952     Lab Results   Component Value Date/Time    PROTIME 11.7 12/20/2022 0819    INR 1.0 12/20/2022 0819     No results found for: \"ABORH\"  No results found for this or any previous visit (from the past 4464 hours).  No results found for this or any previous visit from the past 1095 days.       Visit Vitals  /85 (BP Location: Right arm)   Pulse 84   Temp 36.7 °C (98.1 °F) (Temporal)   Resp 18 " "  Ht 1.753 m (5' 9\")   Wt 91 kg (200 lb 9.9 oz)   SpO2 96%   BMI 29.63 kg/m²   Smoking Status Never   BSA 2.11 m²     NPO/Void Status  Date of Last Liquid: 03/02/25  Time of Last Liquid: 2230  Date of Last Solid: 03/02/25  Time of Last Solid: 2230        Physical Exam    Airway  Mallampati: II  TM distance: >3 FB  Neck ROM: full     Cardiovascular - normal exam  Rhythm: regular  Rate: normal     Dental    Pulmonary - normal exam     Abdominal - normal exam             Anesthesia Plan    History of general anesthesia?: yes  History of complications of general anesthesia?: no    ASA 1     MAC   (Standard ASA monitoring.)  intravenous induction   Anesthetic plan and risks discussed with patient.    Plan discussed with CRNA and CAA.        "

## 2025-03-03 NOTE — PROGRESS NOTES
Called to review her retrieval- she feels she is doing well.   Reviewed that 4 of her eggs were frozen.  AMH = 0.28  Reviewed whether she would want to try another retrieval but she is not sure she would want to try another round of IVF. Reviewed that I would like her to consider another round to feel more reassured about chances of a live birth. She is thinking of trying this again in the fall.   Given activity precautions.   Sharyn Stoner 03/03/25 1:40 PM

## 2025-03-10 ENCOUNTER — SPECIALTY PHARMACY (OUTPATIENT)
Dept: PHARMACY | Facility: CLINIC | Age: 35
End: 2025-03-10

## 2025-03-10 NOTE — PROGRESS NOTES
[facility] Clinical Note  Patient Reassessment- Fertility End of Therapy Note     Introduction  Susana Rivera is a 34 y.o. female who is on the specialty pharmacy service for management of: Fertility Core.      [facility] supplied medication:   Gonal-f 300 unit Redi-Ject pen, Menopur 75 unit vials, Ganirelix 250mcg syringes, Pregnyl 10,000IU vial for trigger , and Leuprolide 1mg/0.2mL kit for trigger    Duration of therapy: 12 days of therapy     Treatment Start Date: 2/18/25  Treatment End Date: 3/1/25      IVF Meeting/Reassessment Tracking:     First Fill Assessment Completed: No - declined   New IVF Cycle- cycle 1, egg freezing, antagonist protocol, start medications 2/18/25     IVF Baseline Starting Doses:   Gonal-F 300 units subcutaneous daily  Menopur 150 units subcutaneous daily  Monitoring again 2/21/25     IVF Meeting 2/21/25:   Continue Gonal-F 300 units subcutaneous daily  Continue Menopur 150 units subcutaneous daily  Monitoring again 2/24/25     IVF Meeting 2/24/25:   Continue Gonal-F 300 units subcutaneous daily  Continue Menopur 150 units subcutaneous daily  Start Ganirelix 250mcg subcutaneous daily 2/25  Monitoring again 2/26/25     IVF Meeting 2/26/25:   Continue Gonal-F 300 units subcutaneous daily  Continue Menopur 150 units subcutaneous daily  Continue Ganirelix 250mcg subcutaneous daily   Monitoring again 2/28/25     IVF Meeting 2/28/25:   Continue Gonal-F 300 units subcutaneous daily  Continue Menopur 150 units subcutaneous daily  Continue Ganirelix 250mcg subcutaneous daily   Monitoring again 3/1/25  *plan is to trigger Saturday for Monday      IVF meeting 3/1/25   Trigger tonight with HCG 10,000 units for egg retrieval 3/3/25    The most recent encounter visit with the referring prescriber Dr. Kang and Dr. Stoner  on 3/3/25 was reviewed.  Pharmacy will continue to collaborate in the care of this patient with the referring prescriber.    Discussion: Unable to reach  patient - 3 attempts. Did not speak with patient directly for end of therapy assessment call. Completed pharmacist reassessment based on IVF meeting monitoring during cycle.     Pharmacist Initial Clinical Assessment Completed: No- pt declined  Protocol: Antagonist  Date of Egg Retrieval: 3/3/25  Successfully completed medication therapy? Yes    Efficacy  Patient has developed new symptoms of condition: No  Patient/caregiver feels medication is affecting the disease state: 4 oocytes retrieved     Goals  Provided education on goals and possible outcomes of therapy:    Successful administration of IVF Medication(s)  Stimulate ovaries to produce multiple eggs for retrieval (Gonal-F, Follistim, Menopur, Low Dose HCG)  Suppress ovaries to prevent premature ovulation (Ganirelix/Cetrotide, Microdose & Long Lupron)  Trigger ovulation to prepare for egg retrieval (Lupron/Pregnyl)  Fertility Preservation/Egg Freezing    All goals achieved through medication therapy.    Tolerance  Patient has experienced side effects from this medication: None reported from IVF meeting.   Changes to current therapy regimen: No- no dose changes to medications during IVF stimulation cycle.    The follow-up timeline was discussed. Every person responds to and reacts to therapy differently. Patient should be assessed for efficacy and tolerability in approximately: N/a, patient is no longer injecting medications for IVF cycle.    Adherence  Patient Information  Informant: Self (Patient) (unable to reach x3, completing based on IVF meeting)  Demonstrates Understanding of Importance of Adherence: Yes  Does the patient have any barriers to self-administration (including physical and mental?): No  Barriers to Self-Administration: none  Action Taken to Mitigate Barriers for Self-Administration: none  Support Network for Adherence: Healthcare Provider  Adherence Tools Used: Calendar  Medication Information  Patient Reported Missed Doses in the Last 4  Weeks: 0 (per IVF Meeting)  Estimated Medication Adherence Level: Good  Adherence Estimation Source: Provider (IVF meeting)  Barriers to Adherence: No Problems identified   The importance of adherence was discussed and patient/caregiver was advised to take the medication as prescribed by their provider. Encouraged patient/caregiver to call physician's office or specialty pharmacy if they have a question regarding a missed dose.    General Assessment  Changes to home medications, OTCs or supplements: No  Current Outpatient Medications   Medication Sig Dispense Refill    albuterol 90 mcg/actuation inhaler Inhale 2 puffs every 6 hours if needed for wheezing. 18 g 0     No current facility-administered medications for this visit.     Reported new allergies: No  Reported new medical conditions: No  Additional monitoring reviewed: N/a  Is laboratory follow up needed? No    Advised to contact the pharmacy if there are any changes to the patient's medication list, including prescriptions, OTC medications, herbal products, or supplements.    Impression/Plan  This patient has not been identified as high risk.   The following action was taken: N/A.         Provided contact information ([phone]) for [facility] and reviewed dispensing process, refill timeline and patient management follow up. Confirmed understanding of education conducted during assessment. All questions and concerns were addressed and patient/caregiver was encouraged to reach out for additional questions or concerns.    Based on the patient's diagnosis, medication list, progress towards goals, adherence, tolerance, and medication list, medication remains appropriate: Therapy remains appropriate (I attest)      Odalys Keller (Katie), [place]  [facility]  Clinical Pharmacy Specialist- Fertility   [facility], Amanda Choudhary  [address] [place], [place] [id number]  Email:  [email or web address]  Tel: [phone]       Fax: [phone]

## 2025-06-03 ENCOUNTER — OFFICE VISIT (OUTPATIENT)
Facility: CLINIC | Age: 35
End: 2025-06-03
Payer: COMMERCIAL

## 2025-06-03 ENCOUNTER — OFFICE VISIT (OUTPATIENT)
Dept: OBSTETRICS AND GYNECOLOGY | Facility: CLINIC | Age: 35
End: 2025-06-03
Payer: COMMERCIAL

## 2025-06-03 VITALS
SYSTOLIC BLOOD PRESSURE: 104 MMHG | BODY MASS INDEX: 28.88 KG/M2 | DIASTOLIC BLOOD PRESSURE: 60 MMHG | WEIGHT: 195 LBS | HEIGHT: 69 IN

## 2025-06-03 DIAGNOSIS — Z00.00 HEALTH CARE MAINTENANCE: ICD-10-CM

## 2025-06-03 DIAGNOSIS — Z12.31 ENCOUNTER FOR SCREENING MAMMOGRAM FOR MALIGNANT NEOPLASM OF BREAST: Primary | ICD-10-CM

## 2025-06-03 DIAGNOSIS — R20.2 PARESTHESIA: ICD-10-CM

## 2025-06-03 DIAGNOSIS — E03.9 HYPOTHYROIDISM, UNSPECIFIED TYPE: ICD-10-CM

## 2025-06-03 DIAGNOSIS — M25.50 ARTHRALGIA, UNSPECIFIED JOINT: ICD-10-CM

## 2025-06-03 DIAGNOSIS — R53.83 MALAISE AND FATIGUE: Primary | ICD-10-CM

## 2025-06-03 DIAGNOSIS — R19.5 LOOSE STOOLS: ICD-10-CM

## 2025-06-03 DIAGNOSIS — R53.81 MALAISE AND FATIGUE: Primary | ICD-10-CM

## 2025-06-03 DIAGNOSIS — R79.89 LOW SERUM VITAMIN D: ICD-10-CM

## 2025-06-03 PROCEDURE — 3008F BODY MASS INDEX DOCD: CPT | Performed by: OBSTETRICS & GYNECOLOGY

## 2025-06-03 PROCEDURE — 99214 OFFICE O/P EST MOD 30 MIN: CPT | Performed by: INTERNAL MEDICINE

## 2025-06-03 PROCEDURE — 1036F TOBACCO NON-USER: CPT | Performed by: OBSTETRICS & GYNECOLOGY

## 2025-06-03 PROCEDURE — 99395 PREV VISIT EST AGE 18-39: CPT | Performed by: OBSTETRICS & GYNECOLOGY

## 2025-06-03 SDOH — ECONOMIC STABILITY: FOOD INSECURITY: WITHIN THE PAST 12 MONTHS, THE FOOD YOU BOUGHT JUST DIDN'T LAST AND YOU DIDN'T HAVE MONEY TO GET MORE.: NEVER TRUE

## 2025-06-03 SDOH — ECONOMIC STABILITY: FOOD INSECURITY: WITHIN THE PAST 12 MONTHS, YOU WORRIED THAT YOUR FOOD WOULD RUN OUT BEFORE YOU GOT MONEY TO BUY MORE.: NEVER TRUE

## 2025-06-03 SDOH — ECONOMIC STABILITY: INCOME INSECURITY: IN THE LAST 12 MONTHS, WAS THERE A TIME WHEN YOU WERE NOT ABLE TO PAY THE MORTGAGE OR RENT ON TIME?: NO

## 2025-06-03 SDOH — ECONOMIC STABILITY: TRANSPORTATION INSECURITY
IN THE PAST 12 MONTHS, HAS LACK OF TRANSPORTATION KEPT YOU FROM MEETINGS, WORK, OR FROM GETTING THINGS NEEDED FOR DAILY LIVING?: NO

## 2025-06-03 SDOH — ECONOMIC STABILITY: TRANSPORTATION INSECURITY
IN THE PAST 12 MONTHS, HAS THE LACK OF TRANSPORTATION KEPT YOU FROM MEDICAL APPOINTMENTS OR FROM GETTING MEDICATIONS?: NO

## 2025-06-03 ASSESSMENT — SOCIAL DETERMINANTS OF HEALTH (SDOH)
WITHIN THE LAST YEAR, HAVE TO BEEN RAPED OR FORCED TO HAVE ANY KIND OF SEXUAL ACTIVITY BY YOUR PARTNER OR EX-PARTNER?: NO
WITHIN THE LAST YEAR, HAVE YOU BEEN AFRAID OF YOUR PARTNER OR EX-PARTNER?: NO
WITHIN THE LAST YEAR, HAVE YOU BEEN HUMILIATED OR EMOTIONALLY ABUSED IN OTHER WAYS BY YOUR PARTNER OR EX-PARTNER?: NO
WITHIN THE LAST YEAR, HAVE YOU BEEN KICKED, HIT, SLAPPED, OR OTHERWISE PHYSICALLY HURT BY YOUR PARTNER OR EX-PARTNER?: NO
HOW HARD IS IT FOR YOU TO PAY FOR THE VERY BASICS LIKE FOOD, HOUSING, MEDICAL CARE, AND HEATING?: NOT HARD AT ALL

## 2025-06-03 ASSESSMENT — LIFESTYLE VARIABLES
HOW OFTEN DO YOU HAVE SIX OR MORE DRINKS ON ONE OCCASION: NEVER
HOW MANY STANDARD DRINKS CONTAINING ALCOHOL DO YOU HAVE ON A TYPICAL DAY: 1 OR 2
SKIP TO QUESTIONS 9-10: 1
HOW OFTEN DO YOU HAVE A DRINK CONTAINING ALCOHOL: MONTHLY OR LESS
AUDIT-C TOTAL SCORE: 1

## 2025-06-03 ASSESSMENT — PAIN SCALES - GENERAL: PAINLEVEL_OUTOF10: 0-NO PAIN

## 2025-06-03 ASSESSMENT — PATIENT HEALTH QUESTIONNAIRE - PHQ9
2. FEELING DOWN, DEPRESSED OR HOPELESS: NOT AT ALL
SUM OF ALL RESPONSES TO PHQ9 QUESTIONS 1 AND 2: 0
1. LITTLE INTEREST OR PLEASURE IN DOING THINGS: NOT AT ALL

## 2025-06-03 NOTE — PROGRESS NOTES
Subjective   Patient ID: Susana Rivera is a 34 y.o. female who presents for Fatigue (Fatigue, Joint Pain, Forgetfulness, No Energy, Weight Gain/Left heel pain).    Pt with a number of symptoms and health concerns, including fatigue/malaise, arthralgia, brain fog, increased weight and L heel pain    Increased symptoms over the past few months        HPI     H/o Covid-19    Review of Systems      Constitutional: Negative for  appetite change, chills and fever.     + Fatigue    + Decreased activity  HENT: Negative for congestion, ear pain, rhinorrhea, sinus pain and sore throat.   Eyes: Negative for pain, discharge and redness.   Respiratory: Negative for cough, shortness of breath and wheezing.   Cardiovascular: Negative for chest pain.   Gastrointestinal: Negative for abdominal pain.   Skin: Negative for rash.   MSK- arthralgia of knees and lower back pain  L heel pain    + Raynauds  Objective   Pulse 70   Wt 89.5 kg (197 lb 5 oz)   LMP 05/11/2025   SpO2 99%   BMI 29.14 kg/m²     Physical Exam      General: WDWN in NAD  HEENT : Mucous membranes are moist. PERRL. Sclera are without icterus.No adenopathy. JVP is not elevated.  Lungs : CTA bilateral. Breath sounds are equal bilaterally  Heart: regular rate & rhythm. No murmurs or extra heart sounds. No rub  Abdomen: soft, non-tender, no masses or organomegaly. Bowel sounds are present.  Extremities: No clubbing, cyanosis, edema  Neuro: A&O x3 . Normal Affect. Gait is normal.  Rheum: No joint swelling    No Synovitis    Assessment/Plan        Constellation of symptoms, including Fatigue/malaise, arthralgia, weight gain, Raynauds, L heel pain    DDX:    Infectious    Inflammatory    Metabolic-R/o Hypothyroidism    Neoplastic    Vasculitis    Plan:    Labs    CBCD    R/o Lyme Disease    JOANNE    CRP    HBA1C    Consider starting GLP-1/GIP Rx to help facilitate with weight loss and ? Decreased inflammation    Addendum- JOANNE -1:40 with negative JOANNE Reflex- discussed with Pt      Consider Rheumatology follow up/surveillance     Recommend foot insert/orthotic- Plantar fasciitis stretches-? DPM    Continue with current Rx    Call/My chart/ follow up if symptoms persist/worsen

## 2025-06-03 NOTE — PROGRESS NOTES
Subjective   Patient ID: Susana Rivera is a 34 y.o. female who presents for Well Women Visit.  Regular monthly periods.  Not sexually active, no contraception.  Felt really good on estradiol during egg freezing stim cycles.    Now tired, word searching, brain fog, weright gain, anhedonia (not enjoying working out)  No IMB.    No breast changes  A few more dark hairs at nipples, chin, infraumbilical line.  Shaves and plucks.  January 2025: Slightly elevated DHEAS, normal 17OHP.  Normal Pap with negative HPV 2023.  She has a non-specific mutation (not BRCA 1 or 2) with increases her Tyrer-Kusick score significantly and she was previously recommended to begin MMG screening age 35.        Review of Systems   All other systems reviewed and are negative.      Objective   Physical Exam  Vitals and nursing note reviewed. Exam conducted with a chaperone present.   Constitutional:       General: She is not in acute distress.     Appearance: Normal appearance. She is not ill-appearing.   HENT:      Head: Normocephalic and atraumatic.      Nose: Nose normal.   Eyes:      Conjunctiva/sclera: Conjunctivae normal.      Pupils: Pupils are equal, round, and reactive to light.   Neck:      Thyroid: No thyroid mass, thyromegaly or thyroid tenderness.   Cardiovascular:      Rate and Rhythm: Normal rate and regular rhythm.      Pulses: Normal pulses.      Heart sounds: Normal heart sounds.   Pulmonary:      Effort: Pulmonary effort is normal.      Breath sounds: Normal breath sounds.   Chest:   Breasts:     Right: Normal. No mass, nipple discharge or skin change.      Left: Normal. No mass, nipple discharge or skin change.   Abdominal:      General: Abdomen is flat. There is no distension.      Palpations: Abdomen is soft. There is no mass.      Tenderness: There is no abdominal tenderness. There is no right CVA tenderness or left CVA tenderness.      Hernia: No hernia is present.   Genitourinary:     General: Normal vulva.      Exam  position: Lithotomy position.      Pubic Area: No rash.       Labia:         Right: No rash, tenderness or lesion.         Left: No rash, tenderness or lesion.       Urethra: No prolapse, urethral swelling or urethral lesion.      Vagina: Normal. No vaginal discharge.      Cervix: No cervical motion tenderness, discharge or lesion.      Uterus: Normal.       Adnexa: Right adnexa normal and left adnexa normal.      Rectum: Normal.   Musculoskeletal:      Cervical back: Normal range of motion.   Lymphadenopathy:      Upper Body:      Right upper body: No axillary adenopathy.      Left upper body: No axillary adenopathy.   Skin:     General: Skin is warm and dry.   Neurological:      General: No focal deficit present.      Mental Status: She is alert and oriented to person, place, and time.   Psychiatric:         Attention and Perception: Attention and perception normal.         Mood and Affect: Mood and affect normal.         Speech: Speech normal.         Behavior: Behavior normal.         Assessment/Plan   Problem List Items Addressed This Visit           ICD-10-CM    Health care maintenance Z00.00    Pap up to date  CBE normal today, MMG ordered as she will be 35 next month.  Hair pattern is within normal limits with slightly elevated DHEAS, normal T, normal 17 OHP.  Currently abstinent.          Other Visit Diagnoses         Codes      Encounter for screening mammogram for malignant neoplasm of breast    -  Primary Z12.31    Relevant Orders    BI mammo bilateral screening tomosynthesis                 Jigna Willett MD MPH 06/03/25 8:12 PM

## 2025-06-04 NOTE — ASSESSMENT & PLAN NOTE
Pap up to date  CBE normal today, MMG ordered as she will be 35 next month.  Hair pattern is within normal limits with slightly elevated DHEAS, normal T, normal 17 OHP.  Currently abstinent.

## 2025-06-08 VITALS
OXYGEN SATURATION: 99 % | DIASTOLIC BLOOD PRESSURE: 74 MMHG | SYSTOLIC BLOOD PRESSURE: 110 MMHG | WEIGHT: 197.31 LBS | HEART RATE: 70 BPM | BODY MASS INDEX: 29.14 KG/M2

## 2025-06-08 DIAGNOSIS — I73.00 RAYNAUD'S PHENOMENON WITHOUT GANGRENE: ICD-10-CM

## 2025-06-08 DIAGNOSIS — R76.8 ANA POSITIVE: ICD-10-CM

## 2025-06-08 DIAGNOSIS — M25.50 ARTHRALGIA, UNSPECIFIED JOINT: Primary | ICD-10-CM

## 2025-06-08 DIAGNOSIS — R73.9 BLOOD GLUCOSE ELEVATED: ICD-10-CM

## 2025-06-08 DIAGNOSIS — E66.3 OVERWEIGHT (BMI 25.0-29.9): ICD-10-CM

## 2025-06-08 DIAGNOSIS — E78.5 HYPERLIPIDEMIA, UNSPECIFIED HYPERLIPIDEMIA TYPE: ICD-10-CM

## 2025-06-08 LAB
25(OH)D3+25(OH)D2 SERPL-MCNC: 54 NG/ML (ref 30–100)
ALBUMIN SERPL-MCNC: 4.3 G/DL (ref 3.6–5.1)
ALP SERPL-CCNC: 49 U/L (ref 31–125)
ALT SERPL-CCNC: 13 U/L (ref 6–29)
ANA PAT SER IF-IMP: ABNORMAL
ANA SER QL IF: POSITIVE
ANA TITR SER IF: ABNORMAL TITER
ANION GAP SERPL CALCULATED.4IONS-SCNC: 7 MMOL/L (CALC) (ref 7–17)
AST SERPL-CCNC: 19 U/L (ref 10–30)
B BURGDOR IGG+IGM SER QL IA: <=0.9 INDEX
BASOPHILS # BLD AUTO: 31 CELLS/UL (ref 0–200)
BASOPHILS NFR BLD AUTO: 0.4 %
BILIRUB SERPL-MCNC: 0.6 MG/DL (ref 0.2–1.2)
BUN SERPL-MCNC: 13 MG/DL (ref 7–25)
CALCIUM SERPL-MCNC: 9.1 MG/DL (ref 8.6–10.2)
CENTROMERE B AB SER-ACNC: ABNORMAL AI
CHLORIDE SERPL-SCNC: 106 MMOL/L (ref 98–110)
CO2 SERPL-SCNC: 27 MMOL/L (ref 20–32)
CREAT SERPL-MCNC: 0.8 MG/DL (ref 0.5–0.97)
CRP SERPL-MCNC: 3.1 MG/L
DSDNA AB SER-ACNC: 1 IU/ML
EGFRCR SERPLBLD CKD-EPI 2021: 98 ML/MIN/1.73M2
ENA JO1 AB SER IA-ACNC: ABNORMAL AI
ENA RNP AB SER-ACNC: ABNORMAL AI
ENA SCL70 AB SER IA-ACNC: ABNORMAL AI
ENA SM AB SER IA-ACNC: ABNORMAL AI
ENA SM+RNP AB SER IA-ACNC: ABNORMAL AI
ENA SS-A AB SER IA-ACNC: ABNORMAL AI
ENA SS-B AB SER IA-ACNC: ABNORMAL AI
EOSINOPHIL # BLD AUTO: 69 CELLS/UL (ref 15–500)
EOSINOPHIL NFR BLD AUTO: 0.9 %
ERYTHROCYTE [DISTWIDTH] IN BLOOD BY AUTOMATED COUNT: 12.7 % (ref 11–15)
ERYTHROCYTE [SEDIMENTATION RATE] IN BLOOD BY WESTERGREN METHOD: 6 MM/H
GLUCOSE SERPL-MCNC: 119 MG/DL (ref 65–99)
HBA1C MFR BLD: 5.6 %
HCT VFR BLD AUTO: 38.8 % (ref 35–45)
HCYS SERPL-SCNC: 9.6 UMOL/L
HGB BLD-MCNC: 12.9 G/DL (ref 11.7–15.5)
LABORATORY COMMENT REPORT: ABNORMAL
LH SERPL-ACNC: 4 MIU/ML
LYMPHOCYTES # BLD AUTO: 1917 CELLS/UL (ref 850–3900)
LYMPHOCYTES NFR BLD AUTO: 24.9 %
MCH RBC QN AUTO: 30.2 PG (ref 27–33)
MCHC RBC AUTO-ENTMCNC: 33.2 G/DL (ref 32–36)
MCV RBC AUTO: 90.9 FL (ref 80–100)
METHYLMALONATE SERPL-SCNC: 112 NMOL/L (ref 55–335)
MONOCYTES # BLD AUTO: 562 CELLS/UL (ref 200–950)
MONOCYTES NFR BLD AUTO: 7.3 %
NEUTROPHILS # BLD AUTO: 5121 CELLS/UL (ref 1500–7800)
NEUTROPHILS NFR BLD AUTO: 66.5 %
NUCLEOSOME AB SER IA-ACNC: ABNORMAL AI
PLATELET # BLD AUTO: 235 THOUSAND/UL (ref 140–400)
PMV BLD REES-ECKER: 10.2 FL (ref 7.5–12.5)
POTASSIUM SERPL-SCNC: 4.3 MMOL/L (ref 3.5–5.3)
PROT SERPL-MCNC: 6.6 G/DL (ref 6.1–8.1)
RBC # BLD AUTO: 4.27 MILLION/UL (ref 3.8–5.1)
RIBOSOMAL P AB SER-ACNC: ABNORMAL AI
SODIUM SERPL-SCNC: 140 MMOL/L (ref 135–146)
T3FREE SERPL-MCNC: 3.1 PG/ML (ref 2.3–4.2)
TESTOST SERPL-MCNC: 26 NG/DL (ref 2–45)
THYROPEROXIDASE AB SERPL-ACNC: <1 IU/ML
TSH SERPL-ACNC: 1.07 MIU/L
VIT B12 SERPL-MCNC: 280 PG/ML (ref 200–1100)
WBC # BLD AUTO: 7.7 THOUSAND/UL (ref 3.8–10.8)

## 2025-06-09 ENCOUNTER — TELEPHONE (OUTPATIENT)
Dept: PRIMARY CARE | Facility: CLINIC | Age: 35
End: 2025-06-09
Payer: COMMERCIAL

## 2025-06-09 NOTE — TELEPHONE ENCOUNTER
T/c with Ms Miguel RE labs with increased Fbg, JOANNE -RFR to clinical pharmacy re Tirzepatide Rx - anticipate Rheum consult ASAP

## 2025-06-13 NOTE — PROGRESS NOTES
Clinical Pharmacy Appointment    Patient ID: Susana Rivera is a 34 y.o. female who presents for No chief complaint on file..    Pt is here for First appointment.     Referring Provider: Alvino Dejesus MD  PCP: Alvino Dejesus MD   Last visit with PCP: 6/3/2025   Next visit with PCP: not yet scheduled       Subjective   HPI  WEIGHT LOSS  PMH significant for obesity.  Special needs/barriers to therapy: None identified    BMI Readings from Last 1 Encounters:   06/03/25 28.80 kg/m²   Starting weight: 195 lbs. (6/3/2025)  Current weight:     Lab Results   Component Value Date    HGBA1C 5.6 06/03/2025    GLUCOSE 119 (H) 06/03/2025   Hyperglycemia: Denies signs and symptoms    Lifestyle  Diet: *** meals/day. ***  BK: ***  LN: ***  DN: ***  Snacks: ***  Drinks: ***  Has worked with nutritionist/dietician? {Yes/No:63602}    Physical Activity: ***    Other Potential Contributing Factors  Medications that may cause weight gain: none  Mental health: No current concerns  Eating Disorders: Denies Hx of any eating disorder  Comorbidities: none    Non-Pharmacological Therapy  Weight loss techniques attempted:  Self-directed dieting:      Pharmacological Therapy  Current Medications: none  Previous Medications: none     Adherence: n/a  Adverse Effects: n/a    Insurance coverage of weight-loss medications? No, plan exclusions   Eligible for copay cards/programs? Yes, commercial insurance       Preventative Care  ASCVD Risk:   The ASCVD Risk score (Raquel DK, et al., 2019) failed to calculate for the following reasons:    The 2019 ASCVD risk score is only valid for ages 40 to 79  On Statin?: No,      Immunizations Needed: Records not available  Tobacco Use: non-smoker    Drug Interactions  No relevant drug interactions were noted.    Medication System Management  Patients preferred pharmacy: [facility]  Adherence/Organization: no issues   Affordability/Accessibility: no issues       Objective   Allergies[1]  Social History  "    Social History Narrative    Not on file      Medication Review  Current Outpatient Medications   Medication Instructions    albuterol 90 mcg/actuation inhaler 2 puffs, inhalation, Every 6 hours PRN    ascorbic acid (VITAMIN C ORAL) oral    cholecalciferol, vitamin D3, (VITAMIN D3 ORAL) oral      Vitals  BP Readings from Last 2 Encounters:   06/03/25 104/60   06/03/25 110/74     BMI Readings from Last 1 Encounters:   06/03/25 28.80 kg/m²      Labs  A1C  Lab Results   Component Value Date    HGBA1C 5.6 06/03/2025    HGBA1C 5.5 01/30/2025     BMP  Lab Results   Component Value Date    CALCIUM 9.1 06/03/2025     06/03/2025    K 4.3 06/03/2025    CO2 27 06/03/2025     06/03/2025    BUN 13 06/03/2025    CREATININE 0.80 06/03/2025    EGFR 98 06/03/2025     LFTs  Lab Results   Component Value Date    ALT 13 06/03/2025    AST 19 06/03/2025    ALKPHOS 49 06/03/2025    BILITOT 0.6 06/03/2025     FLP  No results found for: \"TRIG\", \"CHOL\", \"LDLF\", \"LDLCALC\", \"HDL\"  Urine Microalbumin  No results found for: \"MICROALBCREA\"  Weight Management  Wt Readings from Last 3 Encounters:   06/03/25 88.5 kg (195 lb)   06/03/25 89.5 kg (197 lb 5 oz)   03/03/25 91 kg (200 lb 9.9 oz)      There is no height or weight on file to calculate BMI.     Assessment/Plan   Problem List Items Addressed This Visit    None    Patient's insurance does not cover GLP1s for weight loss. She is also not eligible for them with a diabetes diagnosis since her A1c was too low. Also, her BMI without comorbidities does not make her a candidate for GLP1 therapy for weight loss as the requirement is a BMI > 30 or a BMI > 27 with at least one comorbidity. She can consider  savings options with the help of the PCP if they wish to proceed despite not meeting criteria.     Medication Changes:  CONTINUE    STOP    START    INCREASE    DECREASE      Future Considerations:      Monitoring and Education:  {Patient Education:57898}    Clinical " Pharmacist follow-up: ***, {In-Person / Telehealth:99340} visit    Continue all meds under the continuation of care with the referring provider and clinical pharmacy team.    Thank you,  Hoa Banks, PharmD  Clinical Pharmacist  [phone]    Verbal consent to manage patient's drug therapy was obtained from the patient. They were informed they may decline to participate or withdraw from participation in pharmacy services at any time.       [1]   Allergies  Allergen Reactions    Gluten GI Upset    Sulfa (Sulfonamide Antibiotics) GI Upset     nausea

## 2025-06-16 ENCOUNTER — APPOINTMENT (OUTPATIENT)
Dept: PHARMACY | Facility: HOSPITAL | Age: 35
End: 2025-06-16
Payer: COMMERCIAL

## 2025-06-16 DIAGNOSIS — E66.3 OVERWEIGHT (BMI 25.0-29.9): ICD-10-CM

## 2025-06-16 DIAGNOSIS — R73.9 BLOOD GLUCOSE ELEVATED: ICD-10-CM

## 2025-06-16 DIAGNOSIS — E78.5 HYPERLIPIDEMIA, UNSPECIFIED HYPERLIPIDEMIA TYPE: ICD-10-CM

## 2025-06-16 RX ORDER — TIRZEPATIDE 2.5 MG/.5ML
2.5 INJECTION, SOLUTION SUBCUTANEOUS
Qty: 2 ML | Refills: 1 | Status: SHIPPED | OUTPATIENT
Start: 2025-06-16

## 2025-06-17 NOTE — PROGRESS NOTES
06/17/25 9:18 AM     Chart reviewed for CRC: Colonoscopy ; nothing is submitted to the patient's insurance at this time.     Kathy Rodrigez   [facility]   [facility] Clinical Note  Initial Patient Education- Fertility    Introduction  Susana Rivera is a 34 y.o. female who is on the specialty pharmacy service for management of: Fertility Core.    Susana Rivera is initiating the following therapy. Dose and directions will be documented below for each medication.   Gonal-f 300 unit Redi-Ject pen, Menopur 75 unit vials, Ganirelix 250mcg syringes, Pregnyl 10,000IU vial for trigger , and Leuprolide 1mg/0.2mL kit for trigger    Medication receipt date:  of medications set for 2/11/25  Sent My Chart message reviewing storage of medications- will call patient for a full medication education at baseline.    Duration of therapy: Patient/Provider Specific- depends on response to medication therapy.     The most recent IVF baseline note with the referring prescriber Dr. Stoner  on 2/18/25 was reviewed.  Pharmacy will continue to collaborate in the care of this patient with the referring prescriber.    Medications should only be prescribed by fertility specialists for this indication.    Clinical Background  An initial assessment was conducted prior to first fill of the medication to determine the appropriateness of therapy given the patient's diagnosis, medication list, comorbidities, allergies, medical history, patient's ability to self administer medication, and therapeutic goals based on possible outcomes of therapy. Refer to initial assessment task completed on 2/10/25.    Labs for clinical appropriateness that were reviewed include:   Fertility - Estradiol level:   Lab Results   Component Value Date    ESTRADIOL 111 02/18/2025   , AMH:   Lab Results   Component Value Date    AMH 0.28 01/30/2025   , and BMI: 28.85    Education/Discussion  Susana was contacted on 2/18/25 at 5:02PM for a pharmacy visit with encounter number [phone] from:    [facility]  [address] [place] [id number]  Dept:  [phone]  Dept Fax: [phone]  Susana contacted via telephone for initial patient education. Patient declined pharmacist outreach. Patient has no further questions after nurse teaching and does not wish to re-review medications.    Medication Start Date (planned or actual): 2/18/25  Education was conducted prior to start of therapy? Yes, attempted to provide education prior to start. Patient declined.     Education discussed includes the following:  Patient Education  Learner: Patient  Education Method: Handout (patient states she has read all handouts, no further questions)  Education Response: Refused teaching  Additional details of the medication specific counseling are found within the linked patient education flowsheet.     Date of outreach: 2/18/25  Patient denied the pharmacist's offer of counseling.  Follow up timeline, adherence, goals of therapy, and side effects NOT discussed with patient- declined.   Contact the Fertility Clinical Pharmacy Specialist or Support Liaison with any clinical or billing inquiries, as well as refill requests.      Treatment Start Date: 2/18/25  Next refill date: Tentative based on monitoring  Reassessment date: IVF meetings    Impression/Plan  Review and Assessment   Reviewed During This Encounter: Allergies, Medications, Problem list  Medications Assessed for Appropriate Use, Dose, Route, Frequency, and Duration: Yes  Medication Reconciliation Completed: Yes  Drug Interactions Evaluated: Yes  Clinically Relevant Drug Interactions Identified: No    This patient has not been identified as high risk.   The following action was taken: N/A.         Provided contact information ([phone]) for [facility] and reviewed dispensing process, refill timeline, and patient management follow up. Advised to contact the pharmacy if there are any adverse effects and/or changes to medication list, including prescriptions, OTC medications, herbal products, or  supplements. Confirmed understanding of education conducted during assessment. All questions and concerns were addressed and patient was encouraged to reach out for additional questions or concerns.    Sent to patient:   Below is the link for the [facility] Welcome Packet for your reference:    [email or web address]        Odalys Keller (Katie), PharmISRRAEL  [facility]  Clinical Pharmacy Specialist- [facility], Amanda Choudhary  [address] [place] [address]  Email: [email or web address]  Tel: [phone]       Fax: [phone]

## 2025-06-18 ENCOUNTER — APPOINTMENT (OUTPATIENT)
Dept: PHARMACY | Facility: HOSPITAL | Age: 35
End: 2025-06-18
Payer: COMMERCIAL

## 2025-07-10 ENCOUNTER — TELEMEDICINE (OUTPATIENT)
Dept: PHARMACY | Facility: HOSPITAL | Age: 35
End: 2025-07-10
Payer: COMMERCIAL

## 2025-07-10 DIAGNOSIS — E66.3 OVERWEIGHT (BMI 25.0-29.9): ICD-10-CM

## 2025-07-10 DIAGNOSIS — E78.5 HYPERLIPIDEMIA, UNSPECIFIED HYPERLIPIDEMIA TYPE: ICD-10-CM

## 2025-07-10 RX ORDER — TIRZEPATIDE 2.5 MG/.5ML
2.5 INJECTION, SOLUTION SUBCUTANEOUS
Qty: 2 ML | Refills: 11 | Status: SHIPPED | OUTPATIENT
Start: 2025-07-10

## 2025-07-10 NOTE — PROGRESS NOTES
Clinical Pharmacy Appointment    Patient ID: Susana Rivera is a 34 y.o. female who presents for Obesity.    Pt is here for Follow Up appointment.     Referring Provider: Alvino Dejesus MD  PCP: Alvino Dejesus MD   Last visit with PCP: 6/3/2025   Next visit with PCP: not yet scheduled     Subjective   HPI  WEIGHT LOSS  PMH significant for obesity.  Special needs/barriers to therapy: None identified    BMI Readings from Last 1 Encounters:   06/03/25 28.80 kg/m²   Starting weight: 195 lbs. (6/3/2025)  Current weight:   Date Weight Net Loss   7/10 188 -7 lbs                                     Lab Results   Component Value Date    HGBA1C 5.6 06/03/2025    GLUCOSE 119 (H) 06/03/2025   Hyperglycemia: Denies signs and symptoms    Lifestyle  Diet: generally healthy   Has worked with nutritionist/dietician? No    Physical Activity: walking    Other Potential Contributing Factors  Medications that may cause weight gain: none  Mental health: No current concerns  Eating Disorders: Denies Hx of any eating disorder  Comorbidities: none    Non-Pharmacological Therapy  Weight loss techniques attempted:  Self-directed dieting:      Pharmacological Therapy  Current Medications: Zepbound 2.5 mg weekly   Previous Medications: none     Adherence: Takes medication as directed and reports no missed doses  Adverse Effects: none    Insurance coverage of weight-loss medications? No, plan exclusions   Eligible for copay cards/programs? Yes, commercial insurance       Preventative Care  ASCVD Risk:   The ASCVD Risk score (Raquel WALLACE, et al., 2019) failed to calculate for the following reasons:    The 2019 ASCVD risk score is only valid for ages 40 to 79  On Statin?: No,      Immunizations Needed: Records not available  Tobacco Use: non-smoker    Drug Interactions  No relevant drug interactions were noted.    Medication System Management  Patients preferred pharmacy: [facility]  Adherence/Organization: no issues   Affordability/Accessibility:  "no issues       Objective   Allergies[1]  Social History     Social History Narrative    Not on file      Medication Review  Current Outpatient Medications   Medication Instructions    albuterol 90 mcg/actuation inhaler 2 puffs, inhalation, Every 6 hours PRN    ascorbic acid (VITAMIN C ORAL) oral    cholecalciferol, vitamin D3, (VITAMIN D3 ORAL) oral    Zepbound 2.5 mg, subcutaneous, Every 7 days      Vitals  BP Readings from Last 2 Encounters:   06/03/25 104/60   06/03/25 110/74     BMI Readings from Last 1 Encounters:   06/03/25 28.80 kg/m²      Labs  A1C  Lab Results   Component Value Date    HGBA1C 5.6 06/03/2025    HGBA1C 5.5 01/30/2025     BMP  Lab Results   Component Value Date    CALCIUM 9.1 06/03/2025     06/03/2025    K 4.3 06/03/2025    CO2 27 06/03/2025     06/03/2025    BUN 13 06/03/2025    CREATININE 0.80 06/03/2025    EGFR 98 06/03/2025     LFTs  Lab Results   Component Value Date    ALT 13 06/03/2025    AST 19 06/03/2025    ALKPHOS 49 06/03/2025    BILITOT 0.6 06/03/2025     FLP  No results found for: \"TRIG\", \"CHOL\", \"LDLF\", \"LDLCALC\", \"HDL\"  Urine Microalbumin  No results found for: \"MICROALBCREA\"  Weight Management  Wt Readings from Last 3 Encounters:   06/03/25 88.5 kg (195 lb)   06/03/25 89.5 kg (197 lb 5 oz)   03/03/25 91 kg (200 lb 9.9 oz)      There is no height or weight on file to calculate BMI.     Assessment/Plan   Problem List Items Addressed This Visit    None  Visit Diagnoses         Overweight (BMI 25.0-29.9)        Relevant Medications    tirzepatide, weight loss, (Zepbound) 2.5 mg/0.5 mL solution      Hyperlipidemia, unspecified hyperlipidemia type        Relevant Medications    tirzepatide, weight loss, (Zepbound) 2.5 mg/0.5 mL solution          Patient's insurance does not cover GLP1s for weight loss. She is also not eligible for them with a diabetes diagnosis since her A1c was too low. Also, her BMI with comorbidities makes her a candidate for GLP1 therapy for weight " loss as the requirement is a BMI > 27 with at least one comorbidity. She is willing to move forward with Zepbound vials. Tolerated well. Is taking mostly to help reduce inflammation and it is helping. Does not want to change the dose.    Medication Changes:  Continue  Zepbound 2.5 mg weekly - VIALS     Future Considerations:  Titrate Zepbound as tolerated     Monitoring and Education:  Counseled patient on relevant medication mechanisms of action, expectations, side effects, duration of therapy, contraindications, administration, and monitoring parameters.  All questions and concerns addressed. Contact pharmacist with any further questions or concerns prior to next appointment.    Clinical Pharmacist follow-up: PRN, Telehealth visit    Continue all meds under the continuation of care with the referring provider and clinical pharmacy team.    Thank you,  Hoa Banks, PharmD  Clinical Pharmacist  [phone]    Verbal consent to manage patient's drug therapy was obtained from the patient. They were informed they may decline to participate or withdraw from participation in pharmacy services at any time.         [1]   Allergies  Allergen Reactions    Gluten GI Upset    Sulfa (Sulfonamide Antibiotics) GI Upset     nausea

## 2025-07-14 ENCOUNTER — APPOINTMENT (OUTPATIENT)
Dept: PHARMACY | Facility: HOSPITAL | Age: 35
End: 2025-07-14
Payer: COMMERCIAL

## 2025-08-19 ENCOUNTER — APPOINTMENT (OUTPATIENT)
Dept: RHEUMATOLOGY | Facility: CLINIC | Age: 35
End: 2025-08-19
Payer: COMMERCIAL

## 2025-08-26 ENCOUNTER — APPOINTMENT (OUTPATIENT)
Dept: RHEUMATOLOGY | Facility: CLINIC | Age: 35
End: 2025-08-26
Payer: COMMERCIAL